# Patient Record
Sex: FEMALE | Race: OTHER | Employment: FULL TIME | URBAN - METROPOLITAN AREA
[De-identification: names, ages, dates, MRNs, and addresses within clinical notes are randomized per-mention and may not be internally consistent; named-entity substitution may affect disease eponyms.]

---

## 2017-02-03 ENCOUNTER — HOSPITAL ENCOUNTER (OUTPATIENT)
Dept: LAB | Age: 27
Discharge: HOME | End: 2017-02-03

## 2017-02-03 LAB
ALBUMIN SERPL BCP-MCNC: 3.8 G/DL (ref 3.5–5)
ALBUMIN/GLOB SERPL: 1.2 {RATIO} (ref 1.1–2.2)
ALP SERPL-CCNC: 48 U/L (ref 45–117)
ALT SERPL-CCNC: 19 U/L (ref 12–78)
ANION GAP BLD CALC-SCNC: 8 MMOL/L (ref 5–15)
AST SERPL W P-5'-P-CCNC: 10 U/L (ref 15–37)
BASOPHILS # BLD AUTO: 0 K/UL (ref 0–0.1)
BASOPHILS # BLD: 0 % (ref 0–1)
BILIRUB SERPL-MCNC: 0.3 MG/DL (ref 0.2–1)
BUN SERPL-MCNC: 19 MG/DL (ref 6–20)
BUN/CREAT SERPL: 29 (ref 12–20)
CALCIUM SERPL-MCNC: 8.9 MG/DL (ref 8.5–10.1)
CHLORIDE SERPL-SCNC: 106 MMOL/L (ref 97–108)
CHOLEST SERPL-MCNC: 174 MG/DL
CO2 SERPL-SCNC: 29 MMOL/L (ref 21–32)
CREAT SERPL-MCNC: 0.65 MG/DL (ref 0.55–1.02)
EOSINOPHIL # BLD: 0.2 K/UL (ref 0–0.4)
EOSINOPHIL NFR BLD: 3 % (ref 0–7)
ERYTHROCYTE [DISTWIDTH] IN BLOOD BY AUTOMATED COUNT: 14.5 % (ref 11.5–14.5)
GLOBULIN SER CALC-MCNC: 3.3 G/DL (ref 2–4)
GLUCOSE SERPL-MCNC: 80 MG/DL (ref 65–100)
HCT VFR BLD AUTO: 38.8 % (ref 35–47)
HDLC SERPL-MCNC: 58 MG/DL
HDLC SERPL: 3 {RATIO} (ref 0–5)
HGB BLD-MCNC: 12.4 G/DL (ref 11.5–16)
LDLC SERPL CALC-MCNC: 108 MG/DL (ref 0–100)
LIPID PROFILE,FLP: ABNORMAL
LYMPHOCYTES # BLD AUTO: 40 % (ref 12–49)
LYMPHOCYTES # BLD: 2.4 K/UL (ref 0.8–3.5)
MCH RBC QN AUTO: 27.4 PG (ref 26–34)
MCHC RBC AUTO-ENTMCNC: 32 G/DL (ref 30–36.5)
MCV RBC AUTO: 85.7 FL (ref 80–99)
MONOCYTES # BLD: 0.5 K/UL (ref 0–1)
MONOCYTES NFR BLD AUTO: 8 % (ref 5–13)
NEUTS SEG # BLD: 3 K/UL (ref 1.8–8)
NEUTS SEG NFR BLD AUTO: 49 % (ref 32–75)
PLATELET # BLD AUTO: 312 K/UL (ref 150–400)
POTASSIUM SERPL-SCNC: 4 MMOL/L (ref 3.5–5.1)
PROT SERPL-MCNC: 7.1 G/DL (ref 6.4–8.2)
RBC # BLD AUTO: 4.53 M/UL (ref 3.8–5.2)
SODIUM SERPL-SCNC: 143 MMOL/L (ref 136–145)
TRIGL SERPL-MCNC: 40 MG/DL (ref ?–150)
VLDLC SERPL CALC-MCNC: 8 MG/DL
WBC # BLD AUTO: 6.1 K/UL (ref 3.6–11)

## 2017-02-03 PROCEDURE — 80061 LIPID PANEL: CPT | Performed by: INTERNAL MEDICINE

## 2017-02-03 PROCEDURE — 85025 COMPLETE CBC W/AUTO DIFF WBC: CPT | Performed by: INTERNAL MEDICINE

## 2017-02-03 PROCEDURE — 80053 COMPREHEN METABOLIC PANEL: CPT | Performed by: INTERNAL MEDICINE

## 2021-02-18 ENCOUNTER — TRANSCRIBE ORDER (OUTPATIENT)
Dept: SCHEDULING | Age: 31
End: 2021-02-18

## 2021-02-23 ENCOUNTER — TRANSCRIBE ORDER (OUTPATIENT)
Dept: SCHEDULING | Age: 31
End: 2021-02-23

## 2021-02-23 DIAGNOSIS — N91.5 OLIGOMENORRHEA, UNSPECIFIED: ICD-10-CM

## 2021-02-23 DIAGNOSIS — E22.1 HYPERPROLACTINEMIA (HCC): Primary | ICD-10-CM

## 2021-03-16 ENCOUNTER — HOSPITAL ENCOUNTER (OUTPATIENT)
Dept: MRI IMAGING | Age: 31
Discharge: HOME | End: 2021-03-16
Attending: INTERNAL MEDICINE
Payer: COMMERCIAL

## 2021-03-16 VITALS — WEIGHT: 170 LBS

## 2021-03-16 DIAGNOSIS — E22.1 HYPERPROLACTINEMIA (HCC): ICD-10-CM

## 2021-03-16 DIAGNOSIS — N91.5 OLIGOMENORRHEA, UNSPECIFIED: ICD-10-CM

## 2021-03-16 PROCEDURE — 70553 MRI BRAIN STEM W/O & W/DYE: CPT

## 2021-03-16 PROCEDURE — 74011250636 HC RX REV CODE- 250/636: Performed by: INTERNAL MEDICINE

## 2021-03-16 PROCEDURE — 74011000258 HC RX REV CODE- 258: Performed by: INTERNAL MEDICINE

## 2021-03-16 PROCEDURE — A9585 GADOBUTROL INJECTION: HCPCS | Performed by: INTERNAL MEDICINE

## 2021-03-16 RX ORDER — SODIUM CHLORIDE 0.9 % (FLUSH) 0.9 %
10 SYRINGE (ML) INJECTION ONCE
Status: COMPLETED | OUTPATIENT
Start: 2021-03-16 | End: 2021-03-16

## 2021-03-16 RX ADMIN — Medication 10 ML: at 09:23

## 2021-03-16 RX ADMIN — GADOBUTROL 7.5 ML: 604.72 INJECTION INTRAVENOUS at 09:22

## 2021-03-16 RX ADMIN — SODIUM CHLORIDE 100 ML: 900 INJECTION, SOLUTION INTRAVENOUS at 09:23

## 2021-05-10 LAB
ANTIBODY SCREEN, EXTERNAL: NEGATIVE
CHLAMYDIA, EXTERNAL: NEGATIVE
HBSAG, EXTERNAL: NEGATIVE
HEPATITIS C AB,   EXT: NEGATIVE
HIV, EXTERNAL: NORMAL
N. GONORRHEA, EXTERNAL: NEGATIVE
RUBELLA, EXTERNAL: NORMAL
TYPE, ABO & RH, EXTERNAL: NORMAL

## 2021-10-08 LAB — ANTIBODY SCREEN, EXTERNAL: NEGATIVE

## 2021-10-22 LAB — T. PALLIDUM, EXTERNAL: NORMAL

## 2021-11-14 ENCOUNTER — HOSPITAL ENCOUNTER (OUTPATIENT)
Age: 31
Setting detail: OBSERVATION
Discharge: HOME | End: 2021-11-16
Attending: OBSTETRICS & GYNECOLOGY | Admitting: OBSTETRICS & GYNECOLOGY
Payer: COMMERCIAL

## 2021-11-14 PROBLEM — O26.613 CHOLESTASIS DURING PREGNANCY IN THIRD TRIMESTER: Status: ACTIVE | Noted: 2021-11-14

## 2021-11-14 PROBLEM — O30.009 PREGNANCY, TWINS: Status: ACTIVE | Noted: 2021-11-14

## 2021-11-14 PROBLEM — K83.1 CHOLESTASIS DURING PREGNANCY IN THIRD TRIMESTER: Status: ACTIVE | Noted: 2021-11-14

## 2021-11-14 PROBLEM — O47.03 PRETERM UTERINE CONTRACTIONS IN THIRD TRIMESTER, ANTEPARTUM: Status: ACTIVE | Noted: 2021-11-14

## 2021-11-14 LAB
ALBUMIN SERPL-MCNC: 1.9 G/DL (ref 3.5–5)
ALBUMIN/GLOB SERPL: 0.4 {RATIO} (ref 1.1–2.2)
ALP SERPL-CCNC: 167 U/L (ref 45–117)
ALT SERPL-CCNC: 83 U/L (ref 12–78)
ANION GAP SERPL CALC-SCNC: 10 MMOL/L (ref 5–15)
AST SERPL-CCNC: 31 U/L (ref 15–37)
BILIRUB SERPL-MCNC: 0.2 MG/DL (ref 0.2–1)
BUN SERPL-MCNC: 14 MG/DL (ref 6–20)
BUN/CREAT SERPL: 23 (ref 12–20)
CALCIUM SERPL-MCNC: 9 MG/DL (ref 8.5–10.1)
CHLORIDE SERPL-SCNC: 108 MMOL/L (ref 97–108)
CO2 SERPL-SCNC: 18 MMOL/L (ref 21–32)
COVID-19 RAPID TEST, COVR: NOT DETECTED
CREAT SERPL-MCNC: 0.6 MG/DL (ref 0.55–1.02)
ERYTHROCYTE [DISTWIDTH] IN BLOOD BY AUTOMATED COUNT: 15.2 % (ref 11.5–14.5)
GLOBULIN SER CALC-MCNC: 4.3 G/DL (ref 2–4)
GLUCOSE SERPL-MCNC: 71 MG/DL (ref 65–100)
HCT VFR BLD AUTO: 40.3 % (ref 35–47)
HGB BLD-MCNC: 13.5 G/DL (ref 11.5–16)
MCH RBC QN AUTO: 27.9 PG (ref 26–34)
MCHC RBC AUTO-ENTMCNC: 33.5 G/DL (ref 30–36.5)
MCV RBC AUTO: 83.3 FL (ref 80–99)
NRBC # BLD: 0 K/UL (ref 0–0.01)
NRBC BLD-RTO: 0 PER 100 WBC
PLATELET # BLD AUTO: 347 K/UL (ref 150–400)
PMV BLD AUTO: 10.7 FL (ref 8.9–12.9)
POTASSIUM SERPL-SCNC: 4 MMOL/L (ref 3.5–5.1)
PROT SERPL-MCNC: 6.2 G/DL (ref 6.4–8.2)
RBC # BLD AUTO: 4.84 M/UL (ref 3.8–5.2)
SARS-COV-2, COV2: NORMAL
SODIUM SERPL-SCNC: 136 MMOL/L (ref 136–145)
SOURCE, COVRS: NORMAL
WBC # BLD AUTO: 12.7 K/UL (ref 3.6–11)

## 2021-11-14 PROCEDURE — 74011250636 HC RX REV CODE- 250/636: Performed by: OBSTETRICS & GYNECOLOGY

## 2021-11-14 PROCEDURE — 96360 HYDRATION IV INFUSION INIT: CPT

## 2021-11-14 PROCEDURE — 36415 COLL VENOUS BLD VENIPUNCTURE: CPT

## 2021-11-14 PROCEDURE — 85027 COMPLETE CBC AUTOMATED: CPT

## 2021-11-14 PROCEDURE — 96361 HYDRATE IV INFUSION ADD-ON: CPT

## 2021-11-14 PROCEDURE — G0378 HOSPITAL OBSERVATION PER HR: HCPCS

## 2021-11-14 PROCEDURE — 99285 EMERGENCY DEPT VISIT HI MDM: CPT

## 2021-11-14 PROCEDURE — 74011250637 HC RX REV CODE- 250/637: Performed by: OBSTETRICS & GYNECOLOGY

## 2021-11-14 PROCEDURE — 59025 FETAL NON-STRESS TEST: CPT

## 2021-11-14 PROCEDURE — 87635 SARS-COV-2 COVID-19 AMP PRB: CPT

## 2021-11-14 PROCEDURE — 96372 THER/PROPH/DIAG INJ SC/IM: CPT

## 2021-11-14 PROCEDURE — 76815 OB US LIMITED FETUS(S): CPT

## 2021-11-14 PROCEDURE — 80053 COMPREHEN METABOLIC PANEL: CPT

## 2021-11-14 RX ORDER — NIFEDIPINE 10 MG/1
20 CAPSULE ORAL ONCE
Status: COMPLETED | OUTPATIENT
Start: 2021-11-14 | End: 2021-11-14

## 2021-11-14 RX ORDER — GUAIFENESIN 100 MG/5ML
81 LIQUID (ML) ORAL DAILY
COMMUNITY
End: 2021-12-09

## 2021-11-14 RX ORDER — URSODIOL 500 MG/1
500 TABLET, FILM COATED ORAL 2 TIMES DAILY
Status: DISCONTINUED | OUTPATIENT
Start: 2021-11-14 | End: 2021-11-16 | Stop reason: HOSPADM

## 2021-11-14 RX ORDER — SODIUM CHLORIDE 0.9 % (FLUSH) 0.9 %
5-40 SYRINGE (ML) INJECTION EVERY 8 HOURS
Status: DISCONTINUED | OUTPATIENT
Start: 2021-11-14 | End: 2021-11-16 | Stop reason: HOSPADM

## 2021-11-14 RX ORDER — BETAMETHASONE SODIUM PHOSPHATE AND BETAMETHASONE ACETATE 3; 3 MG/ML; MG/ML
12 INJECTION, SUSPENSION INTRA-ARTICULAR; INTRALESIONAL; INTRAMUSCULAR; SOFT TISSUE EVERY 24 HOURS
Status: COMPLETED | OUTPATIENT
Start: 2021-11-14 | End: 2021-11-15

## 2021-11-14 RX ORDER — SODIUM CHLORIDE 0.9 % (FLUSH) 0.9 %
5-40 SYRINGE (ML) INJECTION AS NEEDED
Status: DISCONTINUED | OUTPATIENT
Start: 2021-11-14 | End: 2021-11-16 | Stop reason: HOSPADM

## 2021-11-14 RX ORDER — URSODIOL 500 MG/1
500 TABLET, FILM COATED ORAL 2 TIMES DAILY
COMMUNITY
End: 2021-12-09

## 2021-11-14 RX ORDER — SODIUM CHLORIDE 9 MG/ML
125 INJECTION, SOLUTION INTRAVENOUS CONTINUOUS
Status: DISPENSED | OUTPATIENT
Start: 2021-11-14 | End: 2021-11-15

## 2021-11-14 RX ADMIN — BETAMETHASONE SODIUM PHOSPHATE AND BETAMETHASONE ACETATE 12 MG: 3; 3 INJECTION, SUSPENSION INTRA-ARTICULAR; INTRALESIONAL; INTRAMUSCULAR at 14:42

## 2021-11-14 RX ADMIN — SODIUM CHLORIDE 1000 ML: 9 INJECTION, SOLUTION INTRAVENOUS at 15:07

## 2021-11-14 RX ADMIN — NIFEDIPINE 20 MG: 10 CAPSULE ORAL at 17:10

## 2021-11-14 RX ADMIN — URSODIOL 500 MG: 500 TABLET, FILM COATED ORAL at 21:11

## 2021-11-14 RX ADMIN — Medication 10 ML: at 23:03

## 2021-11-14 RX ADMIN — SODIUM CHLORIDE 125 ML/HR: 9 INJECTION, SOLUTION INTRAVENOUS at 16:18

## 2021-11-14 NOTE — PROGRESS NOTES
1327: Patient arrived to L & D triage c/o cramping and possible contractions, concerns about light pink discharge, and decreased FM from one of her twins. She is , mono di twins, patient of [facility], medical history pituitary microadenoma, UTI in pregnancy, obesity, and cholestasis in pregnancy. Denies LOF, VB, HA or blurred vision, decreased FM from twin B which is on the right she states. Urine sample obtained, changed to a gown and EFM applied. 1410:  at bedside for examination. SVE 0//3 by , not active labor at this time, POC discussed for 24 observation with continuous monitoring, and betamethasone x2.    1700:  ordered 20mg nifedipine PO now due to contractions Q4m.

## 2021-11-14 NOTE — H&P
History & Physical    Name: Johan Owens MRN: [id number]  SSN: [id number]    YOB: 1990  Age: 28 y.o. Sex: female        Subjective:   CC: Vaginal spotting and cramping  Estimated Date of Delivery: 21  OB History    Para Term  AB Living   1             SAB IAB Ectopic Molar Multiple Live Births                    # Outcome Date GA Lbr Harpreet/2nd Weight Sex Delivery Anes PTL Lv   1 Current                Ms. Sonia Aguilar is admitted for observation with pregnancy at 34w0d for c/o non descript mild cramping for several hours preceeded by minimal vaginal spotting. No ROM. Pt also believes that the baby on her right side is moving less. She had a normal BPPs of 10/10 with normal dopplers x 2. Her cervix has been closed in the office and her last exam was 5 days ago. . Prenatal course was complicated by Mono/Di Twins vtx/breech, Cholestasis of pregnancy, and elevated BMI. Please see prenatal records for details. Past Medical History:   Diagnosis Date    Cholestasis during pregnancy in third trimester 2021     Past Surgical History:   Procedure Laterality Date    HX OTHER SURGICAL      adnoids when 6    HX OTHER SURGICAL      uterine polyps     Social History     Occupational History    Not on file   Tobacco Use    Smoking status: Never Smoker    Smokeless tobacco: Never Used   Substance and Sexual Activity    Alcohol use: Not Currently    Drug use: Never    Sexual activity: Not on file     No family history on file. No Known Allergies  Prior to Admission medications    Medication Sig Start Date End Date Taking? Authorizing Provider   PNV SJ.73/VYQBPUY fum/folic ac (PRENATAL PO) Take  by mouth. Yes Provider, Historical   aspirin 81 mg chewable tablet Take 81 mg by mouth daily. Yes Provider, Historical   ursodioL (ACTIGALL) 500 mg tablet Take 500 mg by mouth two (2) times a day.    Yes Provider, Historical        Review of Systems   Constitutional: Negative for chills and fever.   HENT: Positive for sore throat. Negative for mouth sores. Eyes: Negative for photophobia and visual disturbance. Respiratory: Positive for cough and wheezing. Negative for shortness of breath. Cardiovascular: Negative for chest pain and palpitations. Gastrointestinal: Positive for abdominal pain. Negative for diarrhea, nausea and vomiting. Endocrine: Negative for cold intolerance and heat intolerance. Genitourinary: Positive for vaginal bleeding. Negative for dysuria, genital sores and hematuria. Musculoskeletal: Negative for arthralgias, back pain and myalgias. Skin: Negative for rash. Neurological: Negative for dizziness and headaches. Hematological: Negative for adenopathy. Psychiatric/Behavioral: Negative for agitation, behavioral problems, confusion and decreased concentration. Objective:     Vitals:  Vitals:    11/14/21 1411 11/14/21 1416 11/14/21 1421 11/14/21 1426   BP:       Pulse:       Resp:       Temp:       SpO2: 97% 97% 96% 96%   Weight:       Height:            Physical Exam  Vitals and nursing note reviewed. Exam conducted with a chaperone present. Constitutional:       General: She is not in acute distress. Appearance: Normal appearance. She is obese. She is not ill-appearing, toxic-appearing or diaphoretic. HENT:      Head: Normocephalic and atraumatic. Right Ear: External ear normal.      Left Ear: External ear normal.   Eyes:      General: No scleral icterus. Right eye: No discharge. Left eye: No discharge. Extraocular Movements: Extraocular movements intact. Cardiovascular:      Rate and Rhythm: Normal rate and regular rhythm. Heart sounds: Normal heart sounds. No murmur heard. No friction rub. No gallop. Pulmonary:      Effort: Pulmonary effort is normal. No respiratory distress. Breath sounds: Normal breath sounds. No stridor. No wheezing, rhonchi or rales. Abdominal:      General: There is no distension. Palpations: Abdomen is soft. There is no mass. Tenderness: There is no abdominal tenderness. There is no right CVA tenderness, left CVA tenderness, guarding or rebound. Hernia: No hernia is present. Genitourinary:     General: Normal vulva. Musculoskeletal:         General: No swelling, tenderness, deformity or signs of injury. Cervical back: Normal range of motion and neck supple. No rigidity or tenderness. Right lower leg: No edema. Left lower leg: No edema. Lymphadenopathy:      Cervical: No cervical adenopathy. Skin:     General: Skin is warm and dry. Capillary Refill: Capillary refill takes less than 2 seconds. Coloration: Skin is not jaundiced or pale. Findings: No bruising, erythema, lesion or rash. Neurological:      Mental Status: She is alert and oriented to person, place, and time. Deep Tendon Reflexes: Reflexes normal.   Psychiatric:         Mood and Affect: Mood normal.         Behavior: Behavior normal.         Thought Content: Thought content normal.         Judgment: Judgment normal.         Cervical Exam: Closed/Thick/High  Uterine Activity: Frequency: Q 3-5 minutes  Membranes: Intact  Fetal Heart Rate: Reactive x 2     Prenatal Labs:   No results found for: ABORH, RUBELLAEXT, GRBSEXT, HBSAGEXT, HIVEXT, RPREXT, GONNOEXT, CHLAMEXT, ABORHEXT, RUBELLAEXT, GRBSEXT, HBSAGEXT, HIVEXT, RPREXT, GONNOEXT, CHLAMEXT       Impression/Plan:     Active Problems:    Pregnancy, twins (2021)       uterine contractions in third trimester, antepartum (2021)      Cholestasis during pregnancy in third trimester (2021)         Plan: Admit for observation. Labs,IVFs,BMZ. T/C Nifedipine tocolysis for full steroid coverage if frequency becomes closer or persistent.  for delivery if progresses as per OP plan.

## 2021-11-15 LAB
ALT SERPL-CCNC: 78 U/L (ref 12–78)
AST SERPL-CCNC: 26 U/L (ref 15–37)
GRBS, EXTERNAL: NEGATIVE

## 2021-11-15 PROCEDURE — 84450 TRANSFERASE (AST) (SGOT): CPT

## 2021-11-15 PROCEDURE — 74011250636 HC RX REV CODE- 250/636: Performed by: OBSTETRICS & GYNECOLOGY

## 2021-11-15 PROCEDURE — 87081 CULTURE SCREEN ONLY: CPT

## 2021-11-15 PROCEDURE — G0378 HOSPITAL OBSERVATION PER HR: HCPCS

## 2021-11-15 PROCEDURE — 96372 THER/PROPH/DIAG INJ SC/IM: CPT

## 2021-11-15 PROCEDURE — 84460 ALANINE AMINO (ALT) (SGPT): CPT

## 2021-11-15 PROCEDURE — 90715 TDAP VACCINE 7 YRS/> IM: CPT | Performed by: OBSTETRICS & GYNECOLOGY

## 2021-11-15 PROCEDURE — 74011250637 HC RX REV CODE- 250/637: Performed by: OBSTETRICS & GYNECOLOGY

## 2021-11-15 PROCEDURE — 36415 COLL VENOUS BLD VENIPUNCTURE: CPT

## 2021-11-15 RX ORDER — FOLIC ACID/MULTIVIT,IRON,MINER 0.4MG-18MG
1 TABLET ORAL DAILY
Status: DISCONTINUED | OUTPATIENT
Start: 2021-11-15 | End: 2021-11-16 | Stop reason: HOSPADM

## 2021-11-15 RX ORDER — ASPIRIN 81 MG/1
81 TABLET ORAL DAILY
Status: DISCONTINUED | OUTPATIENT
Start: 2021-11-15 | End: 2021-11-16 | Stop reason: HOSPADM

## 2021-11-15 RX ORDER — FOLIC ACID/MULTIVIT,IRON,MINER 0.4MG-18MG
1 TABLET ORAL DAILY
Status: DISCONTINUED | OUTPATIENT
Start: 2021-11-15 | End: 2021-11-15

## 2021-11-15 RX ORDER — NIFEDIPINE 10 MG/1
20 CAPSULE ORAL 4 TIMES DAILY
Status: DISCONTINUED | OUTPATIENT
Start: 2021-11-15 | End: 2021-11-15

## 2021-11-15 RX ADMIN — NIFEDIPINE 20 MG: 10 CAPSULE ORAL at 09:00

## 2021-11-15 RX ADMIN — URSODIOL 500 MG: 500 TABLET, FILM COATED ORAL at 09:35

## 2021-11-15 RX ADMIN — Medication 1 TABLET: at 10:00

## 2021-11-15 RX ADMIN — SODIUM CHLORIDE 125 ML/HR: 9 INJECTION, SOLUTION INTRAVENOUS at 00:06

## 2021-11-15 RX ADMIN — TETANUS TOXOID, REDUCED DIPHTHERIA TOXOID AND ACELLULAR PERTUSSIS VACCINE, ADSORBED 0.5 ML: 5; 2.5; 8; 8; 2.5 SUSPENSION INTRAMUSCULAR at 21:15

## 2021-11-15 RX ADMIN — ASPIRIN 81 MG: 81 TABLET, COATED ORAL at 10:44

## 2021-11-15 RX ADMIN — BETAMETHASONE SODIUM PHOSPHATE AND BETAMETHASONE ACETATE 12 MG: 3; 3 INJECTION, SUSPENSION INTRA-ARTICULAR; INTRALESIONAL; INTRAMUSCULAR at 16:19

## 2021-11-15 RX ADMIN — URSODIOL 500 MG: 500 TABLET, FILM COATED ORAL at 21:18

## 2021-11-15 NOTE — PROGRESS NOTES
Ante Partum Progress Note    Kati Torres  34w1d    Assessment: G1 34w1d mo/di twins vtx/breech, admitted with spotting,  contractions. Pregnancy cb:   - cholestasis, on ursodiol. Mild elevation in lfts noted in office, with AST44/. Improved here at   - pituitary macroadenoma - followed by endocrine    Plan:    - continues to contract q 3-4 min, some painful overnight. Cervix this am, ft external os/soft/50%/high this am. Will restart procardia 20 q 6 and continue inpatient monitoring for labor. Check gbs. Pt has been counseled regarding mode of delivery and feels most comfortable with c/s in setting of breech 2nd twin. - prematurity - sp bmz #1 3pm yesterday. Repeat today, obs until tomorrow and dc home if no labor. Notify nicu.  - di/mo twins. Concordant. A: 1995g (43%) breech. B g (52%) ceph and presenting      Orders/Charges: Medium and Non Stress Test  X 2    Patient states she has continued contractions, though mostly non-painful. Still light pink spotting. No LOF. Decreased fetal movement of baby on right has resolved. Vitals:  Visit Vitals  /65   Pulse 84   Temp 98.5 °F (36.9 °C)   Resp 16   Ht 5' 2.5\" (1.588 m)   Wt 97.1 kg (214 lb)   LMP  (Exact Date)   SpO2 100%   BMI 38.52 kg/m²     Temp (24hrs), Av °F (36.7 °C), Min:97.6 °F (36.4 °C), Max:98.5 °F (36.9 °C)      Last 24hr Input/Output:    Intake/Output Summary (Last 24 hours) at 11/15/2021 0901  Last data filed at 2021 1619  Gross per 24 hour   Intake 1000 ml   Output    Net 1000 ml        Non stress test:  A: 120, moderate variability, cat I     B: 120, moderate variability, cat I  Patient Vitals for the past 4 hrs: Mode Fetal Heart Rate Fetal Activity Variability Decelerations Accelerations RN Reviewed Strip?  Non Stress Test   11/15/21 0803 External 120  6-25 BPM None Yes Yes Reactive   11/15/21 0700 External 125 Present 6-25 BPM None Yes Yes    11/15/21 0615 External 130 Present 6-25 BPM None Yes Yes    11/15/21 0545 External 125 Present 6-25 BPM Variable Yes Yes    11/15/21 0515 External 125 Present 6-25 BPM None Yes Yes       Patient Vitals for the past 4 hrs: Mode Fetal Heart Rate Fetal Activity Variability Decelerations Accelerations RN Reviewed Strip? Non Stress Test   11/15/21 0803 External 120  6-25 BPM None Yes Yes Reactive   11/15/21 0700 External 125 Present 6-25 BPM None Yes Yes    11/15/21 0615 External 130 Present 6-25 BPM None Yes Yes    11/15/21 0545 External 125 Present 6-25 BPM Variable Yes Yes    11/15/21 0515 External 125 Present 6-25 BPM None Yes Yes         Uterine Activity: q 3-4 min     Exam:  Patient without distress.      Abdomen, fundus soft non-tender     Extremities, no redness or tenderness               Additional Exam: ft/50/high    Labs:     Lab Results   Component Value Date/Time    WBC 12.7 (H) 11/14/2021 02:54 PM    WBC 6.1 02/03/2017 10:30 AM    WBC 8.2 02/01/2011 12:30 PM    HGB 13.5 11/14/2021 02:54 PM    HGB 12.4 02/03/2017 10:30 AM    HGB 13.2 02/01/2011 12:30 PM    HCT 40.3 11/14/2021 02:54 PM    HCT 38.8 02/03/2017 10:30 AM    HCT 41.3 02/01/2011 12:30 PM    PLATELET 680 25/57/0681 02:54 PM    PLATELET 810 06/69/6938 10:30 AM    PLATELET 695 17/00/2146 12:30 PM       Recent Results (from the past 24 hour(s))   CBC W/O DIFF    Collection Time: 11/14/21  2:54 PM   Result Value Ref Range    WBC 12.7 (H) 3.6 - 11.0 K/uL    RBC 4.84 3.80 - 5.20 M/uL    HGB 13.5 11.5 - 16.0 g/dL    HCT 40.3 35.0 - 47.0 %    MCV 83.3 80.0 - 99.0 FL    MCH 27.9 26.0 - 34.0 PG    MCHC 33.5 30.0 - 36.5 g/dL    RDW 15.2 (H) 11.5 - 14.5 %    PLATELET 240 258 - 482 K/uL    MPV 10.7 8.9 - 12.9 FL    NRBC 0.0 0  WBC    ABSOLUTE NRBC 0.00 0.00 - 8.38 K/uL   METABOLIC PANEL, COMPREHENSIVE    Collection Time: 11/14/21  4:04 PM   Result Value Ref Range    Sodium 136 136 - 145 mmol/L    Potassium 4.0 3.5 - 5.1 mmol/L    Chloride 108 97 - 108 mmol/L    CO2 18 (L) 21 - 32 mmol/L    Anion gap 10 5 - 15 mmol/L    Glucose 71 65 - 100 mg/dL    BUN 14 6 - 20 MG/DL    Creatinine 0.60 0.55 - 1.02 MG/DL    BUN/Creatinine ratio 23 (H) 12 - 20      GFR est AA >60 >60 ml/min/1.73m2    GFR est non-AA >60 >60 ml/min/1.73m2    Calcium 9.0 8.5 - 10.1 MG/DL    Bilirubin, total 0.2 0.2 - 1.0 MG/DL    ALT (SGPT) 83 (H) 12 - 78 U/L    AST (SGOT) 31 15 - 37 U/L    Alk.  phosphatase 167 (H) 45 - 117 U/L    Protein, total 6.2 (L) 6.4 - 8.2 g/dL    Albumin 1.9 (L) 3.5 - 5.0 g/dL    Globulin 4.3 (H) 2.0 - 4.0 g/dL    A-G Ratio 0.4 (L) 1.1 - 2.2     SARS-COV-2    Collection Time: 11/14/21  4:04 PM   Result Value Ref Range    SARS-CoV-2 Please find results under separate order     COVID-19 RAPID TEST    Collection Time: 11/14/21  4:04 PM   Result Value Ref Range    Specimen source Nasopharyngeal      COVID-19 rapid test Not detected NOTD     ALT    Collection Time: 11/15/21  5:46 AM   Result Value Ref Range    ALT (SGPT) 78 12 - 78 U/L   AST    Collection Time: 11/15/21  5:46 AM   Result Value Ref Range    AST (SGOT) 26 15 - 37 U/L

## 2021-11-15 NOTE — PROGRESS NOTES
7:15 AM  Bedside shift change report given to Filippo Vieyra RN (oncoming nurse) by Jerardo oNrman RN (offgoing nurse). Report included the following information SBAR, Kardex, MAR and Recent Results.

## 2021-11-15 NOTE — PROGRESS NOTES
Pt feeling better. On afternoon NST both babies are reactive  One small variable of baby B that quickly returned to baseline and reassuring with accels following that  She will get her 2nd bmz now  We discussed discharge home vs monitoring until tomorrow. Given anxiety level and twin pregnancy will obs until tomorrow  Got one dose of nifedipine, will stop now steroid complete  Had questions about tdap vaccine - recommended this, she would like to get this today.

## 2021-11-15 NOTE — PROGRESS NOTES
7:20 PM  Bedside shift change report given to Jerardo Norman RN (oncoming nurse) by Remington Sommer (offgoing nurse). Report included the following information SBAR, Kardex, Intake/Output, MAR and Recent Results. 8:03 PM  SVE performed by Dr Donna Sweeney. Cervix remains closed. May remove pt from monitoring for the night unless pt reports any changes.

## 2021-11-16 VITALS
DIASTOLIC BLOOD PRESSURE: 59 MMHG | HEART RATE: 77 BPM | BODY MASS INDEX: 37.92 KG/M2 | OXYGEN SATURATION: 97 % | SYSTOLIC BLOOD PRESSURE: 109 MMHG | HEIGHT: 63 IN | TEMPERATURE: 97.7 F | WEIGHT: 214 LBS | RESPIRATION RATE: 16 BRPM

## 2021-11-16 PROCEDURE — G0378 HOSPITAL OBSERVATION PER HR: HCPCS

## 2021-11-16 PROCEDURE — 74011250637 HC RX REV CODE- 250/637: Performed by: OBSTETRICS & GYNECOLOGY

## 2021-11-16 RX ADMIN — URSODIOL 500 MG: 500 TABLET, FILM COATED ORAL at 08:54

## 2021-11-16 RX ADMIN — Medication 1 TABLET: at 09:00

## 2021-11-16 RX ADMIN — ASPIRIN 81 MG: 81 TABLET, COATED ORAL at 08:54

## 2021-11-16 NOTE — DISCHARGE INSTRUCTIONS
Patient Education         Labor: Care Instructions  Overview      labor is the start of labor between 21 and 36 weeks of pregnancy. Most babies are born at 40 to 41 weeks of pregnancy. In labor, the uterus contracts to open the cervix. This is the first stage of childbirth.  labor can be caused by a problem with the baby, the mother, or both. Often the cause is not known. In some cases, doctors use medicines to try to delay labor until 29 or more weeks of pregnancy. By this time, a baby has grown enough so that problems are not likely. In some cases--such as with a serious infection--it is healthier for the baby to be born early. Your treatment will depend on how far along you are in your pregnancy and on your health and your baby's health. Follow-up care is a key part of your treatment and safety. Be sure to make and go to all appointments, and call your doctor if you are having problems. It's also a good idea to know your test results and keep a list of the medicines you take. How can you care for yourself at home? · If your doctor prescribed medicines, take them exactly as directed. Call your doctor if you think you are having a problem with your medicine. · Rest until your doctor advises you about activity. · Do not have sexual intercourse unless your doctor says it is safe. · Use sanitary pads if you have vaginal bleeding. Using pads makes it easier to monitor your bleeding. · Do not smoke or allow others to smoke around you. If you need help quitting, talk to your doctor about stop-smoking programs and medicines. These can increase your chances of quitting for good. When should you call for help? Call 911  anytime you think you may need emergency care.  For example, call if:    · You passed out (lost consciousness).     · You have a seizure.     · You have severe vaginal bleeding.     · You have severe pain in your belly or pelvis that doesn't get better between contractions.     · You have had fluid gushing or leaking from your vagina and you know or think the umbilical cord is bulging into your vagina. If this happens, immediately get down on your knees so your rear end (buttocks) is higher than your head. This will decrease the pressure on the cord until help arrives. Call your doctor now or seek immediate medical care if:    · You have signs of preeclampsia, such as:  ? Sudden swelling of your face, hands, or feet. ? New vision problems (such as dimness, blurring, or seeing spots). ? A severe headache.     · You have any vaginal bleeding.     · You have belly pain or cramping.     · You have a fever.     · You have had regular contractions (with or without pain) for an hour. This means that you have 6 or more within 1 hour after you change your position and drink fluids.     · You have a sudden release of fluid from the vagina.     · You have low back pain or pelvic pressure that does not go away.     · You notice that your baby has stopped moving or is moving much less than normal.   Watch closely for changes in your health, and be sure to contact your doctor if you have any problems. Where can you learn more? Go to http://www.gray.com/  Enter Q400 in the search box to learn more about \" Labor: Care Instructions. \"  Current as of: 2021               Content Version: 13.0  © 4308-2905 [employer]. Care instructions adapted under license by [employer] (which disclaims liability or warranty for this information). If you have questions about a medical condition or this instruction, always ask your healthcare professional. [place] [address] any warranty or liability for your use of this information.

## 2021-11-16 NOTE — PROGRESS NOTES
Ante Partum Progress Note    Tejal Fallon Bayron  34w2d    Assessment: G1 34w2d mo/di twins vtx/breech, admitted with spotting,  contractions. Pregnancy cb:   - cholestasis, on ursodiol. Mild elevation in lfts noted in office, with AST44/. Improved here at   - pituitary macroadenoma - followed by endocrine    Plan:    - Contractions spaced s/p Nifedipine, not feeling any this morning  - Cervical exam unchanged yesterday - FT externally/soft/50%/high  - S/p BMZ x2 (, 11/15)  - mo/di twins. Concordant. A: 1995g (43%) breech. B g (52%) ceph and presenting      Orders/Charges: Medium and Non Stress Test  X 2    Contractions largely resolved. Spotting resolved. No LOF. Good FM x2. Vitals:  Visit Vitals  BP (!) 109/59   Pulse 77   Temp 97.7 °F (36.5 °C)   Resp 16   Ht 5' 2.5\" (1.588 m)   Wt 97.1 kg (214 lb)   LMP  (Exact Date)   SpO2 97%   BMI 38.52 kg/m²     Temp (24hrs), Av.1 °F (36.7 °C), Min:97.7 °F (36.5 °C), Max:98.5 °F (36.9 °C)      Last 24hr Input/Output:  No intake or output data in the 24 hours ending 21 0919     Non stress test:  A: 120, moderate variability, cat I     B: 120, moderate variability, cat I  No data found. No data found. Uterine Activity: none, some baseline irritability    Exam:  Patient without distress.      Abdomen, fundus soft non-tender     Extremities, no redness or tenderness               Additional Exam: ft/50/high    Labs:     Lab Results   Component Value Date/Time    WBC 12.7 (H) 2021 02:54 PM    WBC 6.1 2017 10:30 AM    WBC 8.2 2011 12:30 PM    HGB 13.5 2021 02:54 PM    HGB 12.4 2017 10:30 AM    HGB 13.2 2011 12:30 PM    HCT 40.3 2021 02:54 PM    HCT 38.8 2017 10:30 AM    HCT 41.3 2011 12:30 PM    PLATELET 853 45/10/0261 02:54 PM    PLATELET 395  10:30 AM    PLATELET 734  12:30 PM       No results found for this or any previous visit (from the past 24 hour(s)).       Nabor Jean-Baptiste MD

## 2021-11-16 NOTE — DISCHARGE SUMMARY
Antepartum  Discharge Summary     Patient ID:  Josiah Kilgore  [id number]  15 y.o.  1990    Admit date: 2021    Discharge date: 2021    Admission Diagnoses:    Patient Active Problem List   Diagnosis Code    Pregnancy, twins O35.36     uterine contractions in third trimester, antepartum O47.03    Cholestasis during pregnancy in third trimester O26.613, K83.1       Discharge Diagnoses: No discharge information exists for this patient. Patient Active Problem List   Diagnosis Code    Pregnancy, twins O35.36     uterine contractions in third trimester, antepartum O47.03    Cholestasis during pregnancy in third trimester O26.613, K83.1       Procedures for this admission: None    Hospital Course:  Patient admitted for threatened PTL. Received Nifedipine and BMZx2 with resolution of ctx. Cervical exam unchanged and fetal surveillance reassuring. Stable for discharge after 2nd BMZ with close follow up in office scheduled. Disposition: Home or self care    Discharged Condition: stable            Patient Instructions:   Current Discharge Medication List      CONTINUE these medications which have NOT CHANGED    Details   PNV IN.66/YRQMUMB fum/folic ac (PRENATAL PO) Take  by mouth. aspirin 81 mg chewable tablet Take 81 mg by mouth daily. ursodioL (ACTIGALL) 500 mg tablet Take 500 mg by mouth two (2) times a day. Activity: Activity as tolerated  Diet: Regular Diet    Follow-up with No orders of the defined types were placed in this encounter.        Signed:  Jack Calix MD  2021  9:25 AM

## 2021-11-18 LAB
BACTERIA SPEC CULT: NORMAL
SERVICE CMNT-IMP: NORMAL

## 2021-11-30 ENCOUNTER — HOSPITAL ENCOUNTER (EMERGENCY)
Age: 31
Discharge: HOME | End: 2021-11-30
Attending: OBSTETRICS & GYNECOLOGY | Admitting: OBSTETRICS & GYNECOLOGY
Payer: COMMERCIAL

## 2021-11-30 VITALS
TEMPERATURE: 98 F | BODY MASS INDEX: 40.22 KG/M2 | SYSTOLIC BLOOD PRESSURE: 122 MMHG | DIASTOLIC BLOOD PRESSURE: 79 MMHG | HEIGHT: 63 IN | OXYGEN SATURATION: 99 % | WEIGHT: 227 LBS | HEART RATE: 76 BPM

## 2021-11-30 PROCEDURE — 99285 EMERGENCY DEPT VISIT HI MDM: CPT

## 2021-11-30 PROCEDURE — 96360 HYDRATION IV INFUSION INIT: CPT

## 2021-11-30 PROCEDURE — 74011250636 HC RX REV CODE- 250/636: Performed by: STUDENT IN AN ORGANIZED HEALTH CARE EDUCATION/TRAINING PROGRAM

## 2021-11-30 RX ADMIN — SODIUM CHLORIDE, POTASSIUM CHLORIDE, SODIUM LACTATE AND CALCIUM CHLORIDE 1000 ML: 600; 310; 30; 20 INJECTION, SOLUTION INTRAVENOUS at 17:30

## 2021-11-30 NOTE — H&P
History & Physical    Name: Manolo Sweeney MRN: [id number]  SSN: [id number]    YOB: 1990  Age: 28 y.o. Sex: female        Subjective:     Estimated Date of Delivery: 21  OB History    Para Term  AB Living   1             SAB IAB Ectopic Molar Multiple Live Births                    # Outcome Date GA Lbr Harpreet/2nd Weight Sex Delivery Anes PTL Lv   1 Current                Ms. Jennifer Turner is a 28 y.o.  @ 36w2d with mo/di twin pregnancy presenting for rule out  labor. Was seen in clinic yesterday with concern for some fluid leaking and some mild contractions. During that visit nitrazine was positive but ferning and pooling negative. Since that time has stopped leaking but contractions have continued. She states they are only mildly uncomfortable but given twin pregnancy was concerned this may represent  labor. Has not needed tylenol. Good fetal movement. No bleeding. Pregnancy complicated by:  - mo/di twin pregnancy: weekly surveillance all reassuring and planned primary section at 37 weeks  - cholestasis diagnosed at 33 weeks, on ursodiol   - received BMZ @ 34wks, admitted with contractions     Past Medical History:   Diagnosis Date    Cholestasis during pregnancy in third trimester 2021     Past Surgical History:   Procedure Laterality Date    HX OTHER SURGICAL      adnoids when 6    HX OTHER SURGICAL      uterine polyps     Social History     Occupational History    Not on file   Tobacco Use    Smoking status: Never Smoker    Smokeless tobacco: Never Used   Substance and Sexual Activity    Alcohol use: Not Currently    Drug use: Never    Sexual activity: Not on file     History reviewed. No pertinent family history. No Known Allergies  Prior to Admission medications    Medication Sig Start Date End Date Taking? Authorizing Provider   PNV VV.27/YCYMQNA fum/folic ac (PRENATAL PO) Take  by mouth.    Yes Provider, Historical   aspirin 81 mg chewable tablet Take 81 mg by mouth daily. Yes Provider, Historical   ursodioL (ACTIGALL) 500 mg tablet Take 500 mg by mouth two (2) times a day. Yes Provider, Historical        Review of Systems: A comprehensive review of systems was negative except for that written in the HPI. Objective:     Vitals:  Vitals:    11/30/21 1612   Weight: 103 kg (227 lb)   Height: 5' 2.5\" (1.588 m)        Physical Exam:  Patient without distress. Membranes:  Intact  Fetal Heart Rate:    A: 140s/mod claire/+accels/no decels   B: 140s/mod claire/+accels/no decels     Cervix: 1/20/-4, unchanged from in office exam yesterday     Prenatal Labs:   No results found for: ABORH, RUBELLAEXT, GRBSEXT, HBSAGEXT, HIVEXT, RPREXT, GONNOEXT, CHLAMEXT, ABORHEXT, RUBELLAEXT, GRBSEXT, HBSAGEXT, HIVEXT, RPREXT, GONNOEXT, CHLAMEXT      Assessment/Plan:     Active Problems:    * No active hospital problems. *       Plan: Cervix unchanged from exam yesterday in office. Reassuring surveillance. Patient overall very comfortable appearing. Will monitor for 2 hours and plan for recheck. Cinthia Osborne MD  11/30/2021  5:23 PM       1900:    Patient rechecked and cervix unchanged. Overall very comfortable appearing in room. Having ctxs on monitor q 5 minutes but I was present in room during one and patient talking throughout. She lives 10 minutes from hospital. Reviewed return precautions. Will discharge home.      Cinthia Osborne MD  11/30/2021  7:04 PM

## 2021-11-30 NOTE — ROUTINE PROCESS
Pt is in for having contractions since 0930 today. Pt is G1 pregnant with twins at 43/2  1600- Assumed care from Lottie Rebolledo RN. Dr Rula Lemus in to see pt. No cx change since yesterday in office. Recommended po fluids. 1730- Pt continues to have contractions. MD notified order obtained for IVB started. 200- Dr Rula Lemus in assessed pt's cx. No change pt will be discharged to home. Discharge instructions tip sheet reviewed and given to pt. Pt verbalized understanding. 1910- Pt went home.

## 2021-12-01 NOTE — DISCHARGE INSTRUCTIONS
Patient Education        Jane Breeze Contractions: Care Instructions  Your Care Instructions     Estevan Ibrahim contractions prepare your uterus for labor. Think of them as a \"warm-up\" exercise that your body does. You may begin to feel them between the 28th and 30th weeks of your pregnancy. But they start as early as the 20th week. Estevan Ibrahim contractions usually occur more often during the ninth month. They may go away when you are active and return when you rest. These contractions are like mild contractions of true labor, but they occur less often. (You feel fewer than 8 in an hour.) They don't cause your cervix to open. It may be hard for you to tell the difference between Jane Breeze contractions and true labor, especially in your first pregnancy. Follow-up care is a key part of your treatment and safety. Be sure to make and go to all appointments, and call your doctor if you are having problems. It's also a good idea to know your test results and keep a list of the medicines you take. How can you care for yourself at home? · Try a warm bath to help relieve muscle tension and reduce pain. · Change positions every 30 minutes. Take breaks if you must sit for a long time. Get up and walk around. · Drink plenty of water. · Taking short walks may help you feel better. Your doctor needs to check any contractions that are getting stronger or closer together. Where can you learn more? Go to http://www.gray.com/  Enter Z402 in the search box to learn more about \"Estevan Ibrahim Contractions: Care Instructions. \"  Current as of: June 16, 2021               Content Version: 13.0  © 8034-7525 [employer], [employer]. Care instructions adapted under license by [employer] (which disclaims liability or warranty for this information).  If you have questions about a medical condition or this instruction, always ask your healthcare professional. Titus Carreon disclaims any warranty or liability for your use of this information.

## 2021-12-06 ENCOUNTER — HOSPITAL ENCOUNTER (INPATIENT)
Age: 31
LOS: 3 days | Discharge: HOME | End: 2021-12-09
Attending: OBSTETRICS & GYNECOLOGY | Admitting: OBSTETRICS & GYNECOLOGY
Payer: COMMERCIAL

## 2021-12-06 ENCOUNTER — ANESTHESIA (OUTPATIENT)
Dept: LABOR AND DELIVERY | Age: 31
End: 2021-12-06
Payer: COMMERCIAL

## 2021-12-06 ENCOUNTER — ANESTHESIA EVENT (OUTPATIENT)
Dept: LABOR AND DELIVERY | Age: 31
End: 2021-12-06
Payer: COMMERCIAL

## 2021-12-06 ENCOUNTER — HOSPITAL ENCOUNTER (OUTPATIENT)
Dept: PREADMISSION TESTING | Age: 31
Discharge: HOME | End: 2021-12-06

## 2021-12-06 DIAGNOSIS — Z98.891 S/P CESAREAN SECTION: Primary | ICD-10-CM

## 2021-12-06 PROBLEM — O47.03 PRETERM UTERINE CONTRACTIONS IN THIRD TRIMESTER, ANTEPARTUM: Status: RESOLVED | Noted: 2021-11-14 | Resolved: 2021-12-06

## 2021-12-06 PROBLEM — O32.9XX0 MULTIPLE GESTATION WITH ONE OR MORE FETAL MALPRESENTATIONS IN THIRD TRIMESTER: Status: ACTIVE | Noted: 2021-12-06

## 2021-12-06 PROBLEM — O30.009 PREGNANCY, TWINS: Status: RESOLVED | Noted: 2021-11-14 | Resolved: 2021-12-06

## 2021-12-06 PROBLEM — O30.009 TWIN PREGNANCY: Status: ACTIVE | Noted: 2021-12-06

## 2021-12-06 PROBLEM — O30.93 MULTIPLE GESTATION WITH ONE OR MORE FETAL MALPRESENTATIONS IN THIRD TRIMESTER: Status: ACTIVE | Noted: 2021-12-06

## 2021-12-06 PROBLEM — O30.009 TWIN PREGNANCY: Status: RESOLVED | Noted: 2021-12-06 | Resolved: 2021-12-06

## 2021-12-06 PROBLEM — O30.039 MONOCHORIONIC DIAMNIOTIC TWIN GESTATION: Status: ACTIVE | Noted: 2021-12-06

## 2021-12-06 LAB
ABO + RH BLD: NORMAL
AMPHET UR QL SCN: NEGATIVE
BARBITURATES UR QL SCN: NEGATIVE
BASOPHILS # BLD: 0 K/UL (ref 0–0.1)
BASOPHILS NFR BLD: 0 % (ref 0–1)
BENZODIAZ UR QL: NEGATIVE
BLOOD GROUP ANTIBODIES SERPL: NORMAL
CANNABINOIDS UR QL SCN: NEGATIVE
COCAINE UR QL SCN: NEGATIVE
COVID-19 RAPID TEST, COVR: NOT DETECTED
DIFFERENTIAL METHOD BLD: ABNORMAL
DRUG SCRN COMMENT,DRGCM: NORMAL
EOSINOPHIL # BLD: 0.2 K/UL (ref 0–0.4)
EOSINOPHIL NFR BLD: 2 % (ref 0–7)
ERYTHROCYTE [DISTWIDTH] IN BLOOD BY AUTOMATED COUNT: 15.6 % (ref 11.5–14.5)
HCT VFR BLD AUTO: 41.1 % (ref 35–47)
HGB BLD-MCNC: 13.2 G/DL (ref 11.5–16)
IMM GRANULOCYTES # BLD AUTO: 0.1 K/UL (ref 0–0.04)
IMM GRANULOCYTES NFR BLD AUTO: 1 % (ref 0–0.5)
LYMPHOCYTES # BLD: 2.5 K/UL (ref 0.8–3.5)
LYMPHOCYTES NFR BLD: 26 % (ref 12–49)
MCH RBC QN AUTO: 27.4 PG (ref 26–34)
MCHC RBC AUTO-ENTMCNC: 32.1 G/DL (ref 30–36.5)
MCV RBC AUTO: 85.3 FL (ref 80–99)
METHADONE UR QL: NEGATIVE
MONOCYTES # BLD: 0.6 K/UL (ref 0–1)
MONOCYTES NFR BLD: 6 % (ref 5–13)
NEUTS SEG # BLD: 6.5 K/UL (ref 1.8–8)
NEUTS SEG NFR BLD: 65 % (ref 32–75)
NRBC # BLD: 0 K/UL (ref 0–0.01)
NRBC BLD-RTO: 0 PER 100 WBC
OPIATES UR QL: NEGATIVE
PCP UR QL: NEGATIVE
PLATELET # BLD AUTO: 246 K/UL (ref 150–400)
PMV BLD AUTO: 11.1 FL (ref 8.9–12.9)
RBC # BLD AUTO: 4.82 M/UL (ref 3.8–5.2)
SOURCE, COVRS: NORMAL
SPECIMEN EXP DATE BLD: NORMAL
WBC # BLD AUTO: 9.9 K/UL (ref 3.6–11)

## 2021-12-06 PROCEDURE — 76060000078 HC EPIDURAL ANESTHESIA: Performed by: OBSTETRICS & GYNECOLOGY

## 2021-12-06 PROCEDURE — 99285 EMERGENCY DEPT VISIT HI MDM: CPT

## 2021-12-06 PROCEDURE — 74011250636 HC RX REV CODE- 250/636: Performed by: ANESTHESIOLOGY

## 2021-12-06 PROCEDURE — 77010026065 HC OXYGEN MINIMUM MEDICAL AIR

## 2021-12-06 PROCEDURE — 76060000033 HC ANESTHESIA 1 TO 1.5 HR: Performed by: OBSTETRICS & GYNECOLOGY

## 2021-12-06 PROCEDURE — 74011250636 HC RX REV CODE- 250/636

## 2021-12-06 PROCEDURE — 74011250636 HC RX REV CODE- 250/636: Performed by: OBSTETRICS & GYNECOLOGY

## 2021-12-06 PROCEDURE — 74011000250 HC RX REV CODE- 250: Performed by: OBSTETRICS & GYNECOLOGY

## 2021-12-06 PROCEDURE — 75410000002 HC LABOR FEE PER 1 HR

## 2021-12-06 PROCEDURE — 77030003666 HC NDL SPINAL BD -A: Performed by: ANESTHESIOLOGY

## 2021-12-06 PROCEDURE — 77030014125 HC TY EPDRL BBMI -B: Performed by: ANESTHESIOLOGY

## 2021-12-06 PROCEDURE — 85025 COMPLETE CBC W/AUTO DIFF WBC: CPT

## 2021-12-06 PROCEDURE — 88307 TISSUE EXAM BY PATHOLOGIST: CPT

## 2021-12-06 PROCEDURE — 86901 BLOOD TYPING SEROLOGIC RH(D): CPT

## 2021-12-06 PROCEDURE — 87635 SARS-COV-2 COVID-19 AMP PRB: CPT

## 2021-12-06 PROCEDURE — 76010000391 HC C SECN FIRST 1 HR: Performed by: OBSTETRICS & GYNECOLOGY

## 2021-12-06 PROCEDURE — 74011000250 HC RX REV CODE- 250: Performed by: ANESTHESIOLOGY

## 2021-12-06 PROCEDURE — 80307 DRUG TEST PRSMV CHEM ANLYZR: CPT

## 2021-12-06 PROCEDURE — 65410000002 HC RM PRIVATE OB

## 2021-12-06 PROCEDURE — 76010000392 HC C SECN EA ADDL 0.5 HR: Performed by: OBSTETRICS & GYNECOLOGY

## 2021-12-06 PROCEDURE — 75410000003 HC RECOV DEL/VAG/CSECN EA 0.5 HR

## 2021-12-06 PROCEDURE — 36415 COLL VENOUS BLD VENIPUNCTURE: CPT

## 2021-12-06 RX ORDER — MORPHINE SULFATE 0.5 MG/ML
INJECTION, SOLUTION EPIDURAL; INTRATHECAL; INTRAVENOUS AS NEEDED
Status: DISCONTINUED | OUTPATIENT
Start: 2021-12-06 | End: 2021-12-06 | Stop reason: HOSPADM

## 2021-12-06 RX ORDER — KETOROLAC TROMETHAMINE 30 MG/ML
30 INJECTION, SOLUTION INTRAMUSCULAR; INTRAVENOUS
Status: DISPENSED | OUTPATIENT
Start: 2021-12-06 | End: 2021-12-07

## 2021-12-06 RX ORDER — SODIUM CHLORIDE, SODIUM LACTATE, POTASSIUM CHLORIDE, CALCIUM CHLORIDE 600; 310; 30; 20 MG/100ML; MG/100ML; MG/100ML; MG/100ML
1000 INJECTION, SOLUTION INTRAVENOUS CONTINUOUS
Status: DISCONTINUED | OUTPATIENT
Start: 2021-12-06 | End: 2021-12-06

## 2021-12-06 RX ORDER — MISOPROSTOL 200 UG/1
TABLET ORAL
Status: DISCONTINUED
Start: 2021-12-06 | End: 2021-12-06

## 2021-12-06 RX ORDER — IBUPROFEN 400 MG/1
800 TABLET ORAL EVERY 8 HOURS
Status: DISCONTINUED | OUTPATIENT
Start: 2021-12-07 | End: 2021-12-09 | Stop reason: HOSPADM

## 2021-12-06 RX ORDER — OXYTOCIN/RINGER'S LACTATE 30/500 ML
87.3 PLASTIC BAG, INJECTION (ML) INTRAVENOUS AS NEEDED
Status: COMPLETED | OUTPATIENT
Start: 2021-12-06 | End: 2021-12-06

## 2021-12-06 RX ORDER — NALOXONE HYDROCHLORIDE 0.4 MG/ML
0.4 INJECTION, SOLUTION INTRAMUSCULAR; INTRAVENOUS; SUBCUTANEOUS AS NEEDED
Status: DISCONTINUED | OUTPATIENT
Start: 2021-12-06 | End: 2021-12-09 | Stop reason: HOSPADM

## 2021-12-06 RX ORDER — SODIUM CHLORIDE, SODIUM LACTATE, POTASSIUM CHLORIDE, CALCIUM CHLORIDE 600; 310; 30; 20 MG/100ML; MG/100ML; MG/100ML; MG/100ML
1000 INJECTION, SOLUTION INTRAVENOUS CONTINUOUS
Status: CANCELLED | OUTPATIENT
Start: 2021-12-06

## 2021-12-06 RX ORDER — SODIUM CHLORIDE, SODIUM LACTATE, POTASSIUM CHLORIDE, CALCIUM CHLORIDE 600; 310; 30; 20 MG/100ML; MG/100ML; MG/100ML; MG/100ML
125 INJECTION, SOLUTION INTRAVENOUS CONTINUOUS
Status: DISCONTINUED | OUTPATIENT
Start: 2021-12-06 | End: 2021-12-09 | Stop reason: HOSPADM

## 2021-12-06 RX ORDER — ONDANSETRON 4 MG/1
4 TABLET, ORALLY DISINTEGRATING ORAL
Status: DISCONTINUED | OUTPATIENT
Start: 2021-12-06 | End: 2021-12-09 | Stop reason: HOSPADM

## 2021-12-06 RX ORDER — BUPIVACAINE HYDROCHLORIDE 7.5 MG/ML
INJECTION, SOLUTION EPIDURAL; RETROBULBAR AS NEEDED
Status: DISCONTINUED | OUTPATIENT
Start: 2021-12-06 | End: 2021-12-06 | Stop reason: HOSPADM

## 2021-12-06 RX ORDER — SODIUM CHLORIDE 0.9 % (FLUSH) 0.9 %
5-40 SYRINGE (ML) INJECTION EVERY 8 HOURS
Status: DISCONTINUED | OUTPATIENT
Start: 2021-12-06 | End: 2021-12-07

## 2021-12-06 RX ORDER — SIMETHICONE 80 MG
80 TABLET,CHEWABLE ORAL AS NEEDED
Status: DISCONTINUED | OUTPATIENT
Start: 2021-12-06 | End: 2021-12-09 | Stop reason: HOSPADM

## 2021-12-06 RX ORDER — HYDROMORPHONE HYDROCHLORIDE 1 MG/ML
1 INJECTION, SOLUTION INTRAMUSCULAR; INTRAVENOUS; SUBCUTANEOUS
Status: DISCONTINUED | OUTPATIENT
Start: 2021-12-06 | End: 2021-12-09 | Stop reason: HOSPADM

## 2021-12-06 RX ORDER — ONDANSETRON 2 MG/ML
INJECTION INTRAMUSCULAR; INTRAVENOUS AS NEEDED
Status: DISCONTINUED | OUTPATIENT
Start: 2021-12-06 | End: 2021-12-06 | Stop reason: HOSPADM

## 2021-12-06 RX ORDER — DIPHENHYDRAMINE HCL 25 MG
25 CAPSULE ORAL
Status: DISCONTINUED | OUTPATIENT
Start: 2021-12-06 | End: 2021-12-09 | Stop reason: HOSPADM

## 2021-12-06 RX ORDER — SODIUM CHLORIDE, SODIUM LACTATE, POTASSIUM CHLORIDE, CALCIUM CHLORIDE 600; 310; 30; 20 MG/100ML; MG/100ML; MG/100ML; MG/100ML
INJECTION, SOLUTION INTRAVENOUS
Status: DISCONTINUED | OUTPATIENT
Start: 2021-12-06 | End: 2021-12-06 | Stop reason: HOSPADM

## 2021-12-06 RX ORDER — ZOLPIDEM TARTRATE 5 MG/1
5 TABLET ORAL
Status: DISCONTINUED | OUTPATIENT
Start: 2021-12-06 | End: 2021-12-09 | Stop reason: HOSPADM

## 2021-12-06 RX ORDER — OXYTOCIN/RINGER'S LACTATE 30/500 ML
10 PLASTIC BAG, INJECTION (ML) INTRAVENOUS AS NEEDED
Status: CANCELLED | OUTPATIENT
Start: 2021-12-06

## 2021-12-06 RX ORDER — TRANEXAMIC ACID 100 MG/ML
INJECTION, SOLUTION INTRAVENOUS AS NEEDED
Status: DISCONTINUED | OUTPATIENT
Start: 2021-12-06 | End: 2021-12-06 | Stop reason: HOSPADM

## 2021-12-06 RX ORDER — SODIUM CHLORIDE 0.9 % (FLUSH) 0.9 %
5-40 SYRINGE (ML) INJECTION AS NEEDED
Status: DISCONTINUED | OUTPATIENT
Start: 2021-12-06 | End: 2021-12-09 | Stop reason: HOSPADM

## 2021-12-06 RX ORDER — SODIUM CHLORIDE 0.9 % (FLUSH) 0.9 %
5-40 SYRINGE (ML) INJECTION AS NEEDED
Status: DISCONTINUED | OUTPATIENT
Start: 2021-12-06 | End: 2021-12-06

## 2021-12-06 RX ORDER — OXYCODONE HYDROCHLORIDE 5 MG/1
5 TABLET ORAL
Status: DISCONTINUED | OUTPATIENT
Start: 2021-12-06 | End: 2021-12-07

## 2021-12-06 RX ORDER — OXYTOCIN/RINGER'S LACTATE 30/500 ML
10 PLASTIC BAG, INJECTION (ML) INTRAVENOUS AS NEEDED
Status: DISCONTINUED | OUTPATIENT
Start: 2021-12-06 | End: 2021-12-06

## 2021-12-06 RX ORDER — CHOLECALCIFEROL (VITAMIN D3) 50 MCG
CAPSULE ORAL
COMMUNITY

## 2021-12-06 RX ORDER — OXYTOCIN/RINGER'S LACTATE 30/500 ML
87.3 PLASTIC BAG, INJECTION (ML) INTRAVENOUS AS NEEDED
Status: CANCELLED | OUTPATIENT
Start: 2021-12-06

## 2021-12-06 RX ORDER — NALOXONE HYDROCHLORIDE 0.4 MG/ML
0.2 INJECTION, SOLUTION INTRAMUSCULAR; INTRAVENOUS; SUBCUTANEOUS
Status: DISCONTINUED | OUTPATIENT
Start: 2021-12-06 | End: 2021-12-06

## 2021-12-06 RX ORDER — OXYTOCIN/RINGER'S LACTATE 30/500 ML
PLASTIC BAG, INJECTION (ML) INTRAVENOUS
Status: COMPLETED
Start: 2021-12-06 | End: 2021-12-06

## 2021-12-06 RX ORDER — TRANEXAMIC ACID 100 MG/ML
INJECTION, SOLUTION INTRAVENOUS
Status: COMPLETED
Start: 2021-12-06 | End: 2021-12-06

## 2021-12-06 RX ORDER — SODIUM CHLORIDE 0.9 % (FLUSH) 0.9 %
5-40 SYRINGE (ML) INJECTION EVERY 8 HOURS
Status: DISCONTINUED | OUTPATIENT
Start: 2021-12-06 | End: 2021-12-06

## 2021-12-06 RX ORDER — OXYTOCIN 10 [USP'U]/ML
INJECTION, SOLUTION INTRAMUSCULAR; INTRAVENOUS AS NEEDED
Status: DISCONTINUED | OUTPATIENT
Start: 2021-12-06 | End: 2021-12-06 | Stop reason: HOSPADM

## 2021-12-06 RX ADMIN — Medication 87.3 MILLI-UNITS/MIN: at 07:15

## 2021-12-06 RX ADMIN — SODIUM CHLORIDE 120 MCG: 900 INJECTION, SOLUTION INTRAVENOUS at 07:04

## 2021-12-06 RX ADMIN — OXYTOCIN 87.3 MILLI-UNITS/MIN: 10 INJECTION, SOLUTION INTRAMUSCULAR; INTRAVENOUS at 07:15

## 2021-12-06 RX ADMIN — SODIUM CHLORIDE, POTASSIUM CHLORIDE, SODIUM LACTATE AND CALCIUM CHLORIDE: 600; 310; 30; 20 INJECTION, SOLUTION INTRAVENOUS at 06:28

## 2021-12-06 RX ADMIN — SODIUM CHLORIDE, POTASSIUM CHLORIDE, SODIUM LACTATE AND CALCIUM CHLORIDE 125 ML/HR: 600; 310; 30; 20 INJECTION, SOLUTION INTRAVENOUS at 07:15

## 2021-12-06 RX ADMIN — ONDANSETRON HYDROCHLORIDE 4 MG: 2 INJECTION, SOLUTION INTRAMUSCULAR; INTRAVENOUS at 06:16

## 2021-12-06 RX ADMIN — SODIUM CHLORIDE 80 MCG: 900 INJECTION, SOLUTION INTRAVENOUS at 06:12

## 2021-12-06 RX ADMIN — TRANEXAMIC ACID 1 G: 100 INJECTION, SOLUTION INTRAVENOUS at 06:28

## 2021-12-06 RX ADMIN — AZITHROMYCIN MONOHYDRATE 500 MG: 500 INJECTION, POWDER, LYOPHILIZED, FOR SOLUTION INTRAVENOUS at 06:10

## 2021-12-06 RX ADMIN — SODIUM CHLORIDE, POTASSIUM CHLORIDE, SODIUM LACTATE AND CALCIUM CHLORIDE 125 ML/HR: 600; 310; 30; 20 INJECTION, SOLUTION INTRAVENOUS at 23:01

## 2021-12-06 RX ADMIN — SODIUM CHLORIDE 80 MCG: 900 INJECTION, SOLUTION INTRAVENOUS at 06:15

## 2021-12-06 RX ADMIN — SODIUM CHLORIDE 80 MCG: 900 INJECTION, SOLUTION INTRAVENOUS at 06:28

## 2021-12-06 RX ADMIN — SODIUM CHLORIDE, POTASSIUM CHLORIDE, SODIUM LACTATE AND CALCIUM CHLORIDE: 600; 310; 30; 20 INJECTION, SOLUTION INTRAVENOUS at 05:57

## 2021-12-06 RX ADMIN — SODIUM CHLORIDE 80 MCG: 900 INJECTION, SOLUTION INTRAVENOUS at 06:08

## 2021-12-06 RX ADMIN — SODIUM CHLORIDE, POTASSIUM CHLORIDE, SODIUM LACTATE AND CALCIUM CHLORIDE 1000 ML: 600; 310; 30; 20 INJECTION, SOLUTION INTRAVENOUS at 04:57

## 2021-12-06 RX ADMIN — SODIUM CHLORIDE 80 MCG: 900 INJECTION, SOLUTION INTRAVENOUS at 06:18

## 2021-12-06 RX ADMIN — WATER 2 G: 1 INJECTION INTRAMUSCULAR; INTRAVENOUS; SUBCUTANEOUS at 05:53

## 2021-12-06 RX ADMIN — OXYTOCIN 20 UNITS: 10 INJECTION, SOLUTION INTRAMUSCULAR; INTRAVENOUS at 06:28

## 2021-12-06 RX ADMIN — MORPHINE SULFATE 0.25 MG: 0.5 INJECTION, SOLUTION EPIDURAL; INTRATHECAL; INTRAVENOUS at 06:05

## 2021-12-06 RX ADMIN — SODIUM CHLORIDE, POTASSIUM CHLORIDE, SODIUM LACTATE AND CALCIUM CHLORIDE: 600; 310; 30; 20 INJECTION, SOLUTION INTRAVENOUS at 06:58

## 2021-12-06 RX ADMIN — SODIUM CHLORIDE, POTASSIUM CHLORIDE, SODIUM LACTATE AND CALCIUM CHLORIDE 125 ML/HR: 600; 310; 30; 20 INJECTION, SOLUTION INTRAVENOUS at 15:47

## 2021-12-06 RX ADMIN — BUPIVACAINE HYDROCHLORIDE 1.4 ML: 7.5 INJECTION, SOLUTION EPIDURAL; RETROBULBAR at 06:05

## 2021-12-06 RX ADMIN — SODIUM CHLORIDE 80 MCG: 900 INJECTION, SOLUTION INTRAVENOUS at 06:56

## 2021-12-06 NOTE — H&P
History & Physical    Name: Alka Cagle MRN: [id number]  SSN: [id number]    YOB: 1990  Age: 28 y.o. Sex: female      Subjective:     Reason for Admission:  Pregnancy and ROM/labor with twins    History of Present Illness: Alka Cagle is a 28 y.o.  female with an estimated gestational age of 42w4d with Estimated Date of Delivery: 21. Prenatal course complicated by M/D twin gestation, cholestasis in pregnancy and fetal malpresentation presents to L&D c/o loss of fluid this am with onset uterine contractions. Scheduled  delivery for malpresentation tomorrow. Good FM, no vaginal bleeding. OB History        1    Para        Term                AB        Living           SAB        IAB        Ectopic        Molar        Multiple        Live Births                  Past Medical History:   Diagnosis Date    Cholestasis during pregnancy in third trimester 2021     Past Surgical History:   Procedure Laterality Date    HX OTHER SURGICAL      adnoids when 6    HX OTHER SURGICAL      uterine polyps     Social History     Occupational History    Not on file   Tobacco Use    Smoking status: Never Smoker    Smokeless tobacco: Never Used   Substance and Sexual Activity    Alcohol use: Not Currently    Drug use: Never    Sexual activity: Not on file     Family History   Problem Relation Age of Onset    Hypertension Father     Heart Disease Father     Diabetes Maternal Grandmother     Stroke Maternal Grandmother     Hypertension Maternal Grandmother     Cancer Maternal Grandfather        No Known Allergies  Prior to Admission medications    Medication Sig Start Date End Date Taking? Authorizing Provider   B.infantis-B.ani-B.long-B.bifi (Probiotic 4X) 10-15 mg TbEC Take  by mouth. Yes Provider, Historical   PNV GX.67/SODQIOW fum/folic ac (PRENATAL PO) Take  by mouth.    Yes Provider, Historical   aspirin 81 mg chewable tablet Take 81 mg by mouth daily. Yes Provider, Historical   ursodioL (ACTIGALL) 500 mg tablet Take 500 mg by mouth two (2) times a day. Yes Provider, Historical      Review of Systems   Constitutional: Negative for chills and fever. Eyes: Negative for blurred vision. Respiratory: Negative for cough, hemoptysis and shortness of breath. Cardiovascular: Negative for chest pain, palpitations and leg swelling. Gastrointestinal: Positive for abdominal pain. Negative for nausea and vomiting. Genitourinary: Negative. Musculoskeletal: Negative. Skin: Positive for itching. Negative for rash. Neurological: Negative for headaches. Endo/Heme/Allergies: Negative. Psychiatric/Behavioral: Negative. Objective:       Vitals:  Visit Vitals  Ht 5' 2.5\" (1.588 m)   Wt 108.4 kg (239 lb)   LMP  (Exact Date)   BMI 43.02 kg/m²     No data recorded.       General: Well developed, Well nourished and No distress  HEENT: No thyromegaly, Pupils equal, Round and Reactive to light  Lungs: Clear to auscultation bilaterally, Normal breath sounds and Normal respiratory rate  Cardiac: Regular rate, Regular rhythm and No Murmur  Abdomen: No hepatosplenomegaly, Gravid and non tender and Presentation:Vtx and breech (US confirmed)  Pelvic: normal external female genitalia and without lesions  Extremities: No visible lesions, No edema and Edema:Numbers; edema: 3+  Neurologic: alert and oriented x 4, CN grossly intact, Non focal neurologic exam and DTR: REFLEXES: 2+      Presentation Vtx/BR   Cervix 2   Effacement 80%   Station   -1   Membranes ROM, Clear fluid   FHR Cat 1(x2)   Industry  q 3 Minutes       Prenatal labs:  Blood Type: O Pos  RPR:Nonreactive  Rubella: Immune  HIV:Negative  Glucola:Negative  GBS:Negative  HbsAg:Negative  Pap:Normal    Labs:   Recent Results (from the past 24 hour(s))   COVID-19 RAPID TEST    Collection Time: 12/06/21  4:29 AM   Result Value Ref Range    Specimen source Nasopharyngeal      COVID-19 rapid test Not detected NOTD     CBC WITH AUTOMATED DIFF    Collection Time: 21  4:30 AM   Result Value Ref Range    WBC 9.9 3.6 - 11.0 K/uL    RBC 4.82 3.80 - 5.20 M/uL    HGB 13.2 11.5 - 16.0 g/dL    HCT 41.1 35.0 - 47.0 %    MCV 85.3 80.0 - 99.0 FL    MCH 27.4 26.0 - 34.0 PG    MCHC 32.1 30.0 - 36.5 g/dL    RDW 15.6 (H) 11.5 - 14.5 %    PLATELET 529 499 - 936 K/uL    MPV 11.1 8.9 - 12.9 FL    NRBC 0.0 0  WBC    ABSOLUTE NRBC 0.00 0.00 - 0.01 K/uL    NEUTROPHILS 65 32 - 75 %    LYMPHOCYTES 26 12 - 49 %    MONOCYTES 6 5 - 13 %    EOSINOPHILS 2 0 - 7 %    BASOPHILS 0 0 - 1 %    IMMATURE GRANULOCYTES 1 (H) 0.0 - 0.5 %    ABS. NEUTROPHILS 6.5 1.8 - 8.0 K/UL    ABS. LYMPHOCYTES 2.5 0.8 - 3.5 K/UL    ABS. MONOCYTES 0.6 0.0 - 1.0 K/UL    ABS. EOSINOPHILS 0.2 0.0 - 0.4 K/UL    ABS. BASOPHILS 0.0 0.0 - 0.1 K/UL    ABS. IMM. GRANS. 0.1 (H) 0.00 - 0.04 K/UL    DF AUTOMATED         Patient Active Problem List   Diagnosis Code    Cholestasis during pregnancy in third trimester O26.613, K83.1    Monochorionic diamniotic twin gestation O34.200    Multiple gestation with one or more fetal malpresentations in third trimester O30.93, O27. 9XX0     Assessment and Plan:     28 y.o.  at 37w1d  Mono/Di twin gestation Vtx/Br with SROM. Cholestasis in pregnancy     Admit to L&D   Proceed with  Delivery as planned for multifetal pregnancy and malpresentation. I reviewed  the risks of surgery, the risks include infection, bleeding,  damage to organs including bowel, bladder, ureters, vessels, possible need for hysterectomy, blood transfusion, and future surgery to repair undiagnosed damage to organs. Questions were answered. Consent was signed.      Wally Chang MD          Signed By:  Wally Chang MD     2021 5:04 AM

## 2021-12-06 NOTE — ANESTHESIA PREPROCEDURE EVALUATION
Relevant Problems   GASTROINTESTINAL   (+) Cholestasis during pregnancy in third trimester       Anesthetic History   No history of anesthetic complications            Review of Systems / Medical History  Patient summary reviewed, nursing notes reviewed and pertinent labs reviewed    Pulmonary  Within defined limits                 Neuro/Psych   Within defined limits           Cardiovascular  Within defined limits                Exercise tolerance: >4 METS     GI/Hepatic/Renal  Within defined limits              Endo/Other        Obesity     Other Findings   Comments: 37 week twins         Physical Exam    Airway  Mallampati: I  TM Distance: 4 - 6 cm  Neck ROM: normal range of motion   Mouth opening: Normal     Cardiovascular  Regular rate and rhythm,  S1 and S2 normal,  no murmur, click, rub, or gallop  Rhythm: regular  Rate: normal         Dental  No notable dental hx       Pulmonary  Breath sounds clear to auscultation               Abdominal  GI exam deferred       Other Findings            Anesthetic Plan    ASA: 2  Anesthesia type: spinal            Anesthetic plan and risks discussed with: Patient

## 2021-12-06 NOTE — OP NOTES
Operative Note      Patient: Aditi Lockett         MRN: [id number]  [facility] Date: 2021  Attending:                    Rhoderick Cowden, MD      Date of Operation: 2021    Preoperative Diagnosis: 28 y.o.  at 42w4d                                               Twin pregnancy [O30.009]                                             Mono/Di twins            Fetal malpresentation    Postoperative Diagnosis:  As above                                                    Indication for Operation: 28 y.o.  at 42w4d  Br/VTX M/D twins in labor with SROM    Surgeon(s):  Tammy    Procedure: Primary Low Transverse  Section    Anesthesia: Spinal anesthesia    Anesthesiologist:       Estimated Blood Loss:  700cc    Pathology: Placentas    Findings: term born female baby A, Vtx, , Term born female baby B, . Normal female pelvic anatomy    Procedure Details: After consent was obtained a spinal block was placed per anesthesia. She was placed in a supine position with left lateral displacement. Hayes catheter was placed. Abdomen prepped with scrub solution. After assuring adequet regional anesthesia, a Pfannenstiel incision was made in the lower abdomen and carried sharply down to the fascia. Fascia scored in midline the carried laterally by sharp dissection. Rectus muscles were  in midline and the abdomen was bluntly entered. Bladder flap was created, bladder blade placed. Low transverse uterine incision was made carried to the membranes. Membranes sharply entered. Uterine incision carried laterally and superiorly by blunt dissection. Baby A vertex delivered followed by the remainder of the fetus. Cord cut after 30 second delay and baby handed to NICU team. Baby B delivered by footling breech extraction without difficulty. Cord cut after 30 second delay. Placenta manually removed. Uterus delivered onto abdominal surface. Uterine cavivity scraped with dry lap sponge. Uterine incision closed with 0 Vicryl running locking suture. Second imbricating suture used. ) chromic figure of 8 used for  2 bleeding sites. Vesicouterine peritoneal fold closed with 3-0 Vicryl. Uterus delivered into abdominal cavity. Final inspection of hysterotomy site hemostatic. Parietal peritoneum closed with 2-0 Vicryl. Muscle inspected, hemostatic. Fascia closed with 0 Vicryl. Irrigation of SQ layer performed. Insorb used to close skin. Dermabond and bandages applied. Sent to RR in good condition. All counts correct. Zithromax 500mg and Ancef 2gm infused prior to onset of surgery.        Derrick Shelton MD  Date of operation:  12/6/2021        Date of dictation:  12/6/2021

## 2021-12-06 NOTE — PROGRESS NOTES
Nutrition: [facility] referral received for multigestation. Admitted with twin pregnancy and SROM. Now s/p delivery via  section. Nutrition assessment not indicated at this time. RD available by consult if needs arise. Thanks.   Vladimir Cox RD

## 2021-12-06 NOTE — PROGRESS NOTES
Post-Operative  Day 0    Tejal Mitchell     Assessment: Post-Op day 0, stable from [facility] for mo/di twins (vtx/br) and term labor with SROM. Plan:     - Routine post-operative care. - N/A   - Will check post-op Hgb tomorrow. Starting 13.2. Plan to start Fe at discharge if pt anemic. Information for the patient's :  Ree Morrison [[id number]]   , Low Transverse   Information for the patient's :  Ree Morrison [[id number]]   , Low Transverse     Patient doing well without significant complaint. Just had c/s several hours ago, still numb from spinal with shi in place. Pain is controlled. No nausea/vomiting, hasn't started a diet yet. Bleeding is normal.      Vitals:  Visit Vitals  BP (!) 101/56   Pulse 76   Resp 14   Ht 5' 2.5\" (1.588 m)   Wt 108.4 kg (239 lb)   LMP  (Exact Date)   SpO2 97%   Breastfeeding Unknown   BMI 43.02 kg/m²     No data recorded. Last 24hr Input/Output:    Intake/Output Summary (Last 24 hours) at 2021 0949  Last data filed at 2021 0658  Gross per 24 hour   Intake 1250 ml   Output    Net 1250 ml          Exam:     Patient without distress. Fundus firm, nontender per nursing fundal checks. Incision bandaged, clean, dry, intact. Perineum with normal lochia noted per nursing assessment. Lower extremities are negative for pathological edema.     Labs:       Recent Results (from the past 24 hour(s))   COVID-19 RAPID TEST    Collection Time: 21  4:29 AM   Result Value Ref Range    Specimen source Nasopharyngeal      COVID-19 rapid test Not detected NOTD     TYPE & SCREEN    Collection Time: 21  4:29 AM   Result Value Ref Range    Crossmatch Expiration 2021,8099     ABO/Rh(D) Delgado [place] POSITIVE     Antibody screen NEG    CBC WITH AUTOMATED DIFF    Collection Time: 21  4:30 AM   Result Value Ref Range    WBC 9.9 3.6 - 11.0 K/uL    RBC 4.82 3.80 - 5.20 M/uL HGB 13.2 11.5 - 16.0 g/dL    HCT 41.1 35.0 - 47.0 %    MCV 85.3 80.0 - 99.0 FL    MCH 27.4 26.0 - 34.0 PG    MCHC 32.1 30.0 - 36.5 g/dL    RDW 15.6 (H) 11.5 - 14.5 %    PLATELET 763 919 - 059 K/uL    MPV 11.1 8.9 - 12.9 FL    NRBC 0.0 0  WBC    ABSOLUTE NRBC 0.00 0.00 - 0.01 K/uL    NEUTROPHILS 65 32 - 75 %    LYMPHOCYTES 26 12 - 49 %    MONOCYTES 6 5 - 13 %    EOSINOPHILS 2 0 - 7 %    BASOPHILS 0 0 - 1 %    IMMATURE GRANULOCYTES 1 (H) 0.0 - 0.5 %    ABS. NEUTROPHILS 6.5 1.8 - 8.0 K/UL    ABS. LYMPHOCYTES 2.5 0.8 - 3.5 K/UL    ABS. MONOCYTES 0.6 0.0 - 1.0 K/UL    ABS. EOSINOPHILS 0.2 0.0 - 0.4 K/UL    ABS. BASOPHILS 0.0 0.0 - 0.1 K/UL    ABS. IMM.  GRANS. 0.1 (H) 0.00 - 0.04 K/UL    DF AUTOMATED     DRUG SCREEN, URINE    Collection Time: 12/06/21  4:30 AM   Result Value Ref Range    AMPHETAMINES Negative NEG      BARBITURATES Negative NEG      BENZODIAZEPINES Negative NEG      COCAINE Negative NEG      METHADONE Negative NEG      OPIATES Negative NEG      PCP(PHENCYCLIDINE) Negative NEG      THC (TH-CANNABINOL) Negative NEG      Drug screen comment (NOTE)

## 2021-12-06 NOTE — ANESTHESIA PROCEDURE NOTES
Spinal Block    Start time: 12/6/2021 6:00 AM  End time: 12/6/2021 6:05 AM  Performed by: Jimmy Thomson MD  Authorized by: Jimmy Thomson MD     Pre-procedure: Indications: at surgeon's request and primary anesthetic  Preanesthetic Checklist: patient identified, risks and benefits discussed, anesthesia consent, site marked, patient being monitored and timeout performed      Spinal Block:   Patient Position:  Seated  Prep Region:  Lumbar  Prep: chlorhexidine      Location:  L2-3  Technique:  Single shot        Needle:   Needle Type:   Vy  Needle Gauge:  25 G  Attempts:  1      Events: CSF confirmed, no blood with aspiration and no paresthesia        Assessment:  Insertion:  Uncomplicated  Patient tolerance:  Patient tolerated the procedure well with no immediate complications

## 2021-12-07 LAB
BASOPHILS # BLD: 0 K/UL (ref 0–0.1)
BASOPHILS NFR BLD: 0 % (ref 0–1)
DIFFERENTIAL METHOD BLD: ABNORMAL
EOSINOPHIL # BLD: 0.1 K/UL (ref 0–0.4)
EOSINOPHIL NFR BLD: 1 % (ref 0–7)
ERYTHROCYTE [DISTWIDTH] IN BLOOD BY AUTOMATED COUNT: 15.7 % (ref 11.5–14.5)
HCT VFR BLD AUTO: 33.9 % (ref 35–47)
HGB BLD-MCNC: 10.9 G/DL (ref 11.5–16)
IMM GRANULOCYTES # BLD AUTO: 0.1 K/UL (ref 0–0.04)
IMM GRANULOCYTES NFR BLD AUTO: 1 % (ref 0–0.5)
LYMPHOCYTES # BLD: 2.3 K/UL (ref 0.8–3.5)
LYMPHOCYTES NFR BLD: 19 % (ref 12–49)
MCH RBC QN AUTO: 27.7 PG (ref 26–34)
MCHC RBC AUTO-ENTMCNC: 32.2 G/DL (ref 30–36.5)
MCV RBC AUTO: 86 FL (ref 80–99)
MONOCYTES # BLD: 0.8 K/UL (ref 0–1)
MONOCYTES NFR BLD: 7 % (ref 5–13)
NEUTS SEG # BLD: 9.1 K/UL (ref 1.8–8)
NEUTS SEG NFR BLD: 72 % (ref 32–75)
NRBC # BLD: 0 K/UL (ref 0–0.01)
NRBC BLD-RTO: 0 PER 100 WBC
PLATELET # BLD AUTO: 217 K/UL (ref 150–400)
PMV BLD AUTO: 10.8 FL (ref 8.9–12.9)
RBC # BLD AUTO: 3.94 M/UL (ref 3.8–5.2)
WBC # BLD AUTO: 12.5 K/UL (ref 3.6–11)

## 2021-12-07 PROCEDURE — 74011250636 HC RX REV CODE- 250/636: Performed by: OBSTETRICS & GYNECOLOGY

## 2021-12-07 PROCEDURE — 74011250637 HC RX REV CODE- 250/637: Performed by: OBSTETRICS & GYNECOLOGY

## 2021-12-07 PROCEDURE — 85025 COMPLETE CBC W/AUTO DIFF WBC: CPT

## 2021-12-07 PROCEDURE — 36415 COLL VENOUS BLD VENIPUNCTURE: CPT

## 2021-12-07 PROCEDURE — 65410000002 HC RM PRIVATE OB

## 2021-12-07 RX ORDER — OXYCODONE HYDROCHLORIDE 5 MG/1
10 TABLET ORAL
Status: DISCONTINUED | OUTPATIENT
Start: 2021-12-07 | End: 2021-12-09 | Stop reason: HOSPADM

## 2021-12-07 RX ADMIN — KETOROLAC TROMETHAMINE 30 MG: 30 INJECTION, SOLUTION INTRAMUSCULAR; INTRAVENOUS at 06:07

## 2021-12-07 RX ADMIN — KETOROLAC TROMETHAMINE 30 MG: 30 INJECTION, SOLUTION INTRAMUSCULAR; INTRAVENOUS at 00:28

## 2021-12-07 RX ADMIN — OXYCODONE 5 MG: 5 TABLET ORAL at 09:27

## 2021-12-07 RX ADMIN — Medication 10 ML: at 00:29

## 2021-12-07 RX ADMIN — OXYCODONE 5 MG: 5 TABLET ORAL at 18:37

## 2021-12-07 RX ADMIN — IBUPROFEN 800 MG: 400 TABLET, FILM COATED ORAL at 14:07

## 2021-12-07 RX ADMIN — OXYCODONE 5 MG: 5 TABLET ORAL at 14:07

## 2021-12-07 RX ADMIN — IBUPROFEN 800 MG: 400 TABLET, FILM COATED ORAL at 22:15

## 2021-12-07 RX ADMIN — OXYCODONE 10 MG: 5 TABLET ORAL at 22:15

## 2021-12-07 RX ADMIN — Medication 10 ML: at 06:07

## 2021-12-07 NOTE — PROGRESS NOTES
Bedside and Verbal shift change report given to JAMIE Harden RN (oncoming nurse) by JAMIE Hernández  (offgoing nurse). Report included the following information SBAR, Kardex, Procedure Summary, Intake/Output, MAR and Recent Results.

## 2021-12-07 NOTE — ANESTHESIA POSTPROCEDURE EVALUATION
* No procedures listed *.    spinal    Anesthesia Post Evaluation        Patient location during evaluation: PACU  Note status: Adequate. Level of consciousness: responsive to verbal stimuli and sleepy but conscious  Pain management: satisfactory to patient  Airway patency: patent  Anesthetic complications: no  Cardiovascular status: acceptable  Respiratory status: acceptable  Hydration status: acceptable  Comments: +Post-Anesthesia Evaluation and Assessment    Patient: Agustina Rodas MRN: [id number]  SSN: [id number]   YOB: 1990  Age: 28 y.o. Sex: female      Cardiovascular Function/Vital Signs    BP (!) 107/59 (BP 1 Location: Left upper arm)   Pulse 80   Temp 36.4 °C (97.5 °F)   Resp 16   Ht 5' 2.5\" (1.588 m)   Wt 108.4 kg (239 lb)   SpO2 97%   Breastfeeding Unknown   BMI 43.02 kg/m²     Patient is status post * No procedures listed *. Nausea/Vomiting: Controlled. Postoperative hydration reviewed and adequate. Pain:  Pain Scale 1: Numeric (0 - 10) (12/07/21 0700)  Pain Intensity 1: 5 (12/07/21 0700)   Managed. Neurological Status:   Neuro (WDL): Within Defined Limits (12/07/21 0020)   At baseline. Mental Status and Level of Consciousness: Arousable. Pulmonary Status:   O2 Device: None (Room air) (12/07/21 0440)   Adequate oxygenation and airway patent. Complications related to anesthesia: None    Post-anesthesia assessment completed. No concerns. Signed By: Pascual Mukherjee MD    12/7/2021  Post anesthesia nausea and vomiting:  controlled      INITIAL Post-op Vital signs: No vitals data found for the desired time range.

## 2021-12-07 NOTE — PROGRESS NOTES
1925:  Bedside and Verbal shift change report given to EREN Gallego RN (oncoming nurse) by CHRISTIAN Rincon RN (offgoing nurse). Report included the following information SBAR, Kardex, Intake/Output, MAR and Recent Results. 0520:  Bedside and Verbal shift change report given to JAMIE Marlow RN (oncoming nurse) by EREN Gallego RN (offgoing nurse). Report included the following information SBAR, Kardex, Intake/Output, MAR and Recent Results.

## 2021-12-07 NOTE — PROGRESS NOTES
Duramorph Follow-Up Note    1 Day Post-Op sp Procedure(s):   SECTION. BP (!) 107/59 (BP 1 Location: Left upper arm)   Pulse 80   Temp 36.4 °C (97.5 °F)   Resp 16   Ht 5' 2.5\" (1.588 m)   Wt 108.4 kg (239 lb)   LMP  (Exact Date)   SpO2 97%   Breastfeeding Unknown   BMI 43.02 kg/m² . Patient is POD-1 S/P intrathecal duramorph. Pain is well controlled  Patient reports no headache, fever, weakness or numbness. Epidural/spinal tap site is clean, dry and intact. No obvious Anesthesia complications noted. Plan:    Pain management as per primary service.

## 2021-12-07 NOTE — PROGRESS NOTES
Post-Operative  Day 1    Tejal Mitchell     Assessment: Post-Op day 1 from [facility] for mo/do twins in term labor   Plan:     - Routine post-operative care. - N/A   - Postop hemoglobin stable. Plan to start Fe at discharge if pt anemic.  - Ambulate today. Information for the patient's :  Grisel Guzman [[id number]]   , Low Transverse   Information for the patient's :  Deannsharathmary Megan [[id number]]   , Low Transverse     Patient doing well without significant complaint. Tolerating diet. Hayes out. Ambulating. Doing well with twins, adjusting. Vitals:  Visit Vitals  /70 (BP 1 Location: Left upper arm, BP Patient Position: Sitting)   Pulse 90   Temp 98.3 °F (36.8 °C)   Resp 16   Ht 5' 2.5\" (1.588 m)   Wt 108.4 kg (239 lb)   LMP  (Exact Date)   SpO2 97%   Breastfeeding Unknown   BMI 43.02 kg/m²     Temp (24hrs), Av.9 °F (36.6 °C), Min:97.5 °F (36.4 °C), Max:98.3 °F (36.8 °C)      Last 24hr Input/Output:    Intake/Output Summary (Last 24 hours) at 2021 1727  Last data filed at 2021 1500  Gross per 24 hour   Intake 1000 ml   Output 4075 ml   Net -3075 ml          Exam:     Patient without distress. Fundus firm, nontender per nursing fundal checks. Incision bandaged, clean, dry, intact. Perineum with normal lochia noted per nursing assessment. Lower extremities are negative for pathological edema.     Labs:     Hgb: 13.2 --> 10.9

## 2021-12-08 PROCEDURE — 74011250637 HC RX REV CODE- 250/637: Performed by: OBSTETRICS & GYNECOLOGY

## 2021-12-08 PROCEDURE — 65410000002 HC RM PRIVATE OB

## 2021-12-08 RX ADMIN — IBUPROFEN 800 MG: 400 TABLET, FILM COATED ORAL at 13:52

## 2021-12-08 RX ADMIN — OXYCODONE 10 MG: 5 TABLET ORAL at 18:21

## 2021-12-08 RX ADMIN — OXYCODONE 10 MG: 5 TABLET ORAL at 02:41

## 2021-12-08 RX ADMIN — OXYCODONE 10 MG: 5 TABLET ORAL at 08:20

## 2021-12-08 RX ADMIN — IBUPROFEN 800 MG: 400 TABLET, FILM COATED ORAL at 06:00

## 2021-12-08 RX ADMIN — OXYCODONE 10 MG: 5 TABLET ORAL at 12:45

## 2021-12-08 RX ADMIN — OXYCODONE 5 MG: 5 TABLET ORAL at 23:08

## 2021-12-08 RX ADMIN — IBUPROFEN 800 MG: 400 TABLET, FILM COATED ORAL at 22:05

## 2021-12-08 NOTE — ROUTINE PROCESS
Bedside and Verbal shift change report given to TAYLOR Cordova RN (oncoming nurse) by JAMIE Harden RN (offgoing nurse). Report included the following information SBAR, Kardex, Intake/Output and MAR.

## 2021-12-08 NOTE — PROGRESS NOTES
Post-Operative  Day 2    Tejal Mitchell     Assessment: Post-Op day 2 from [facility] for mo/do twins in term labor   Plan:     - Routine post-operative care. - N/A   - Postop hemoglobin stable. Plan to start Fe at discharge if pt anemic.  - Ambulate today. Information for the patient's :  Jess Ansari [[id number]]   , Low Transverse   Information for the patient's :  Jess Ansari [[id number]]   , Low Transverse     Patient doing well without significant complaint. Tolerating diet. Hayes out. Ambulating. Passing flatus. Vitals:  Visit Vitals  /63 (BP 1 Location: Left upper arm, BP Patient Position: At rest;Sitting)   Pulse 86   Temp 98.6 °F (37 °C)   Resp 16   Ht 5' 2.5\" (1.588 m)   Wt 108.4 kg (239 lb)   LMP  (Exact Date)   SpO2 98%   Breastfeeding Unknown   BMI 43.02 kg/m²     Temp (24hrs), Av.3 °F (36.8 °C), Min:97.5 °F (36.4 °C), Max:98.9 °F (37.2 °C)      Last 24hr Input/Output:    Intake/Output Summary (Last 24 hours) at 2021 1120  Last data filed at 2021 1500  Gross per 24 hour   Intake    Output 900 ml   Net -900 ml          Exam:     Patient without distress. Fundus firm, nontender per nursing fundal checks. Incision bandaged, clean, dry, intact. Perineum with normal lochia noted per nursing assessment. Lower extremities are negative for pathological edema.     Labs:     Hgb: 13.2 --> 10.9     Galilea Cancino MD

## 2021-12-08 NOTE — PROGRESS NOTES
1915. Bedside shift change report given to TAYLOR Hairston RN (oncoming nurse) by JAMIE Harden RN (offgoing nurse). Report included the following information SBAR, Kardex, Procedure Summary, Intake/Output, MAR and Recent Results. 0725. Bedside shift change report given to SARANYA Sandhu RN (oncoming nurse) by Deyvi Corado. Nelson Hairston RN (offgoing nurse). Report included the following information SBAR, Kardex, Procedure Summary, Intake/Output, MAR and Recent Results.

## 2021-12-09 VITALS
HEIGHT: 63 IN | RESPIRATION RATE: 18 BRPM | SYSTOLIC BLOOD PRESSURE: 119 MMHG | HEART RATE: 88 BPM | DIASTOLIC BLOOD PRESSURE: 62 MMHG | WEIGHT: 239 LBS | OXYGEN SATURATION: 98 % | BODY MASS INDEX: 42.35 KG/M2 | TEMPERATURE: 97.4 F

## 2021-12-09 PROCEDURE — 74011250637 HC RX REV CODE- 250/637: Performed by: OBSTETRICS & GYNECOLOGY

## 2021-12-09 RX ORDER — DOCUSATE SODIUM 100 MG/1
100 CAPSULE, LIQUID FILLED ORAL
Qty: 60 CAPSULE | Refills: 0 | Status: SHIPPED | OUTPATIENT
Start: 2021-12-09

## 2021-12-09 RX ORDER — OXYCODONE AND ACETAMINOPHEN 5; 325 MG/1; MG/1
1 TABLET ORAL
Qty: 30 TABLET | Refills: 0 | Status: SHIPPED | OUTPATIENT
Start: 2021-12-09 | End: 2021-12-16

## 2021-12-09 RX ORDER — IBUPROFEN 600 MG/1
600 TABLET ORAL
Qty: 60 TABLET | Refills: 0 | Status: SHIPPED | OUTPATIENT
Start: 2021-12-09

## 2021-12-09 RX ADMIN — OXYCODONE 5 MG: 5 TABLET ORAL at 03:11

## 2021-12-09 RX ADMIN — OXYCODONE 5 MG: 5 TABLET ORAL at 08:32

## 2021-12-09 RX ADMIN — IBUPROFEN 800 MG: 400 TABLET, FILM COATED ORAL at 06:20

## 2021-12-09 RX ADMIN — OXYCODONE 10 MG: 5 TABLET ORAL at 12:28

## 2021-12-09 NOTE — PROGRESS NOTES
Post-Operative  Day 3    Tejal Mitchell       Assessment: Post-Op day 3, doing well  From [facility] for mo/di twins p term labor. Hgb 13.2>10.9. Plan:   - Discharge home today. - Follow up in office in 6 week(s) with [facility].  - Pain medication prescription(s) sent. - Questions answered. Information for the patient's :  Herschel Meigs [[id number]]   , Low Transverse   Information for the patient's :  Herschel Meigs [[id number]]   , Low Transverse    Patient doing well without significant complaint. Tolerating regular diet. Ambulating. Voiding without difficulty. Vitals:  Visit Vitals  /68   Pulse 76   Temp 97.6 °F (36.4 °C)   Resp 16   Ht 5' 2.5\" (1.588 m)   Wt 108.4 kg (239 lb)   LMP  (Exact Date)   SpO2 98%   Breastfeeding Unknown   BMI 43.02 kg/m²     Temp (24hrs), Av.8 °F (36.6 °C), Min:97.2 °F (36.2 °C), Max:98.5 °F (36.9 °C)        Exam:       Patient without distress. Fundus firm, nontender per nursing fundal checks. Incision clean, dry, intact. Perineum with normal lochia noted per nursing assessment. Lower extremities are negative for pathological edema. Labs:     No results found for this or any previous visit (from the past 24 hour(s)).

## 2021-12-09 NOTE — ROUTINE PROCESS
Bedside and Verbal shift change report given to Michelle Rosas RN (oncoming nurse) by SKYLAR Aldana Mt, RN (offgoing nurse). Report included the following information SBAR, Kardex, Intake/Output, MAR and Recent Results.

## 2021-12-09 NOTE — DISCHARGE SUMMARY
Obstetrical Discharge Summary     Name: Aretha Lemon MRN: [id number]  SSN: [id number]    YOB: 1990  Age: 28 y.o. Sex: female      Admit Date: 2021    Discharge Date: 2021     Admitting Physician: Jordan Corbett MD     Attending Physician:  Tray Vera MD     Admission Diagnoses: Twin pregnancy [O30.009]    Discharge Diagnoses:   Information for the patient's :  Terese Prince [[id number]]   Delivery of a 2.695 kg female infant via , Low Transverse on 2021 at 6:26 AM  by Jordan Corbett. Apgars were 9  and 9 . Information for the patient's :  Terese Prince [[id number]]   Delivery of a 2.495 kg female infant via , Low Transverse on 2021 at 6:27 AM  by Jordan Corbett. Apgars were 8  and 9 . Additional Diagnoses:   Hospital Problems  Date Reviewed: 2021          Codes Class Noted POA    Monochorionic diamniotic twin gestation ICD-10-CM: O30.039  ICD-9-CM: 651.00, V91.02  2021 Yes        Multiple gestation with one or more fetal malpresentations in third trimester ICD-10-CM: O30.93, O32. 9XX0  ICD-9-CM: 652.63  2021 Yes             Lab Results   Component Value Date/Time    Rubella, External Immune 6.33 05/10/2021 12:00 AM    GrBStrep, External Negative 11/15/2021 12:00 AM       Hospital Course: [facility] for mo/di twins. Hgb 13.2>10.9. Normal hospital course following the delivery. Patient Instructions:   Current Discharge Medication List      START taking these medications    Details   ibuprofen (MOTRIN) 600 mg tablet Take 1 Tablet by mouth every six (6) hours as needed for Pain. Qty: 60 Tablet, Refills: 0      docusate sodium (Colace) 100 mg capsule Take 1 Capsule by mouth two (2) times daily as needed for Constipation for up to 30 doses.   Qty: 60 Capsule, Refills: 0      oxyCODONE-acetaminophen (Percocet) 5-325 mg per tablet Take 1 Tablet by mouth every four (4) hours as needed for Pain for up to 7 days. Max Daily Amount: 6 Tablets. Qty: 30 Tablet, Refills: 0    Associated Diagnoses: S/P  section         CONTINUE these medications which have NOT CHANGED    Details   B.infantis-B.ani-B.long-B.bifi (Probiotic 4X) 10-15 mg TbEC Take  by mouth. PNV II.51/YHBZWRY fum/folic ac (PRENATAL PO) Take  by mouth. STOP taking these medications       aspirin 81 mg chewable tablet Comments:   Reason for Stopping:         ursodioL (ACTIGALL) 500 mg tablet Comments:   Reason for Stopping:               Disposition at Discharge: Home or self care    Condition at Discharge: Stable    Reference my discharge instructions.     Follow-up Appointments   Procedures    FOLLOW UP VISIT Appointment in: 6 Weeks     Standing Status:   Standing     Number of Occurrences:   1     Order Specific Question:   Appointment in     Answer:   6 Weeks        Signed By:  Margarita Adhikari MD     2021

## 2021-12-09 NOTE — DISCHARGE INSTRUCTIONS
Patient Education         Section: What to Expect at Home  Your Recovery     A  section, or , is surgery to deliver your baby through a cut that the doctor makes in your lower belly and uterus. The cut is called an incision. You may have some pain in your lower belly and need pain medicine for 1 to 2 weeks. You can expect some vaginal bleeding for several weeks. You will probably need about 6 weeks to fully recover. It's important to take it easy while the incision heals. Avoid heavy lifting, strenuous activities, and exercises that strain the belly muscles while you recover. Ask a family member or friend for help with housework, cooking, and shopping. This care sheet gives you a general idea about how long it will take for you to recover. But each person recovers at a different pace. Follow the steps below to get better as quickly as possible. How can you care for yourself at home? Activity    · Rest when you feel tired. Getting enough sleep will help you recover.     · Try to walk each day. Start by walking a little more than you did the day before. Bit by bit, increase the amount you walk. Walking boosts blood flow and helps prevent pneumonia, constipation, and blood clots.     · Avoid strenuous activities, such as bicycle riding, jogging, weightlifting, and aerobic exercise, for 6 weeks or until your doctor says it is okay.     · Until your doctor says it is okay, do not lift anything heavier than your baby.     · Do not do sit-ups or other exercises that strain the belly muscles for 6 weeks or until your doctor says it is okay.     · Hold a pillow over your incision when you cough or take deep breaths. This will support your belly and decrease your pain.     · You may shower as usual. Pat the incision dry when you are done.     · You will have some vaginal bleeding. Wear sanitary pads.  Do not douche or use tampons until your doctor says it is okay.     · Ask your doctor when you can drive again.     · You will probably need to take at least 6 weeks off work. It depends on the type of work you do and how you feel.     · Ask your doctor when it is okay for you to have sex. Diet    · You can eat your normal diet. If your stomach is upset, try bland, low-fat foods like plain rice, broiled chicken, toast, and yogurt.     · Drink plenty of fluids (unless your doctor tells you not to).     · You may notice that your bowel movements are not regular right after your surgery. This is common. Try to avoid constipation and straining with bowel movements. You may want to take a fiber supplement every day. If you have not had a bowel movement after a couple of days, ask your doctor about taking a mild laxative.     · If you are breastfeeding, limit alcohol. Alcohol can cause a lack of energy and other health problems for the baby when a breastfeeding woman drinks heavily. It can also get in the way of a mom's ability to feed her baby or to care for the child in other ways. There isn't a lot of research about exactly how much alcohol can harm a baby. Having no alcohol is the safest choice for your baby. If you choose to have a drink now and then, have only one drink, and limit the number of occasions that you have a drink. Wait to breastfeed at least 2 hours after you have a drink to reduce the amount of alcohol the baby may get in the milk. Medicines    · Your doctor will tell you if and when you can restart your medicines. He or she will also give you instructions about taking any new medicines.     · If you take aspirin or some other blood thinner, ask your doctor if and when to start taking it again. Make sure that you understand exactly what your doctor wants you to do.     · Take pain medicines exactly as directed. ? If the doctor gave you a prescription medicine for pain, take it as prescribed.   ? If you are not taking a prescription pain medicine, ask your doctor if you can take an over-the-counter medicine.     · If you think your pain medicine is making you sick to your stomach:  ? Take your medicine after meals (unless your doctor has told you not to). ? Ask your doctor for a different pain medicine.     · If your doctor prescribed antibiotics, take them as directed. Do not stop taking them just because you feel better. You need to take the full course of antibiotics. Incision care    · If you have strips of tape on the incision, leave the tape on for a week or until it falls off.     · Wash the area daily with warm, soapy water, and pat it dry. Don't use hydrogen peroxide or alcohol, which can slow healing. You may cover the area with a gauze bandage if it weeps or rubs against clothing. Change the bandage every day.     · Keep the area clean and dry. Other instructions    · If you breastfeed your baby, you may be more comfortable while you are healing if you place the baby so that he or she is not resting on your belly. Try tucking your baby under your arm, with his or her body along the side you will be feeding on. Support your baby's upper body with your arm. With that hand you can control your baby's head to bring his or her mouth to your breast. This is sometimes called the football hold. Follow-up care is a key part of your treatment and safety. Be sure to make and go to all appointments, and call your doctor if you are having problems. It's also a good idea to know your test results and keep a list of the medicines you take. When should you call for help? Call 911  anytime you think you may need emergency care. For example, call if:    · You have thoughts of harming yourself, your baby, or another person.     · You passed out (lost consciousness).     · You have chest pain, are short of breath, or cough up blood.     · You have a seizure.    Call your doctor now or seek immediate medical care if:    · You have pain that does not get better after you take pain medicine.     · You have severe vaginal bleeding.     · You are dizzy or lightheaded, or you feel like you may faint.     · You have new or worse pain in your belly or pelvis.     · You have loose stitches, or your incision comes open.     · You have symptoms of infection, such as:  ? Increased pain, swelling, warmth, or redness. ? Red streaks leading from the incision. ? Pus draining from the incision. ? A fever.     · You have symptoms of a blood clot in your leg (called a deep vein thrombosis), such as:  ? Pain in your calf, back of the knee, thigh, or groin. ? Redness and swelling in your leg or groin.     · You have signs of preeclampsia, such as:  ? Sudden swelling of your face, hands, or feet. ? New vision problems (such as dimness, blurring, or seeing spots). ? A severe headache. Watch closely for changes in your health, and be sure to contact your doctor if:    · You do not get better as expected. Where can you learn more? Go to http://www.[employer].com/  Enter M806 in the search box to learn more about \" Section: What to Expect at Home. \"  Current as of: 2021               Content Version: 13.0   [employer]. Care instructions adapted under license by [employer] (which disclaims liability or warranty for this information). If you have questions about a medical condition or this instruction, always ask your healthcare professional. [place] [address] any warranty or liability for your use of this information.

## 2022-03-18 PROBLEM — O32.9XX0 MULTIPLE GESTATION WITH ONE OR MORE FETAL MALPRESENTATIONS IN THIRD TRIMESTER: Status: ACTIVE | Noted: 2021-12-06

## 2022-03-18 PROBLEM — O30.93 MULTIPLE GESTATION WITH ONE OR MORE FETAL MALPRESENTATIONS IN THIRD TRIMESTER: Status: ACTIVE | Noted: 2021-12-06

## 2022-03-19 PROBLEM — O26.643 CHOLESTASIS DURING PREGNANCY IN THIRD TRIMESTER: Status: ACTIVE | Noted: 2021-11-14

## 2022-03-19 PROBLEM — O30.039 MONOCHORIONIC DIAMNIOTIC TWIN GESTATION: Status: ACTIVE | Noted: 2021-12-06

## 2025-04-10 ENCOUNTER — INITIAL PRENATAL (OUTPATIENT)
Age: 35
End: 2025-04-10

## 2025-04-10 VITALS
TEMPERATURE: 97.4 F | DIASTOLIC BLOOD PRESSURE: 60 MMHG | WEIGHT: 157.6 LBS | RESPIRATION RATE: 16 BRPM | HEIGHT: 63 IN | BODY MASS INDEX: 27.93 KG/M2 | HEART RATE: 75 BPM | SYSTOLIC BLOOD PRESSURE: 89 MMHG | OXYGEN SATURATION: 99 %

## 2025-04-10 DIAGNOSIS — Z34.90 PREGNANCY, UNSPECIFIED GESTATIONAL AGE: Primary | ICD-10-CM

## 2025-04-10 PROCEDURE — 0500F INITIAL PRENATAL CARE VISIT: CPT | Performed by: OBSTETRICS & GYNECOLOGY

## 2025-04-10 RX ORDER — OMEGA-3-ACID ETHYL ESTERS 1 G/1
1 CAPSULE, LIQUID FILLED ORAL 2 TIMES DAILY
COMMUNITY

## 2025-04-10 RX ORDER — MAGNESIUM 30 MG
30 TABLET ORAL 2 TIMES DAILY
COMMUNITY

## 2025-04-10 NOTE — PROGRESS NOTES
Radha Mast is a 34 y.o. female presents for a new pregnancy visit.    Chief Complaint   Patient presents with    Initial Prenatal Visit     Patient's last menstrual period was 2024 (exact date).    Some records scanned into media- pt needs urine culture sent today per records    Declined genetic testing    Last Pap: 25 NILM/HPV neg    Feeling better in the 2nd trimester!    Hand eczema (flares). H/o Cholestasis in pregnancy- concerned it would be hard for her to tell the directly    Saw Endocrinologist approx 2 years ago for Microadenoma follow up    H/o  - Mono Di Twins (2nd twin breech), Pituitary Microadenoma (asymptomatic during previous pregnancy per records, Migraines, Polypectomy    2 girls! 3 yo     LMP history:  The date of her LMP is 24.    Pregnancy symptoms:  Since her LMP she has experienced urinary frequency, breast tenderness, and nausea.   She has not been vomiting over the last few weeks.  Associated signs and symptoms which she denies: dysuria, discharge, vaginal bleeding.    Relevant past pregnancy history:   She has the following pregnancy history: - C/S  twins    She has no history of  delivery.    Relevant past medical history:(relevant to this pregnancy): noncontributory.       1. Have you been to the ER, urgent care clinic, or hospitalized since your last visit? No    2. Have you seen or consulted any other health care providers outside of the [facility] since your last visit? Aubree Powers LPN  4/10/2025  12:08 PM      
with G1    PMH:  -BMI 28 -  approp wt gain  -eczema hands  -microadenoma (prolactinoma) --> no meds, asymptomatic, last saw endocrine 2 years ago (consider referral and labs next visit)  -migraines (ocular? Aura?)  -h/o hysteroscopic polypectomy  -fam h/o mom with term stillbirth  -severe n/v after anesthesia with CS    Genetics/Carrier screening: declined pano/hz/afp    PNL: O pos / ABSC neg / needs hgb electrophoresis / Hgb 12.0, plts 332 / HIV, hepB, hepC, rubella, tpal neg / needs: VZV, chl, Trich / GC neg / urine cx 4/10 / pap NILM HPV neg 3/18/25  -Glucola:  -28 week labs:  -GBS:    Vaccines:  -Flu:  -Covid:  -Tdap:  -RSV:    Delivery/PP plans:  -Breast/Formula  -NCB/Epid  -Gender/Circ?    Social:  -FOB:  Dani  -twin 3 yo girls! Kayla  -pt's family from [place], some [employer]ism ancestry  -works from home as , pro-life    RTC: 1 month, or sooner prn for problems or concerns  -cramping, pain, bleeding precautions reviewed  -handouts and instructions provided     Jenn Duke MD  4/10/2025  4:08 PM

## 2025-04-12 LAB
BACTERIA SPEC CULT: NORMAL
CC UR VC: NORMAL
SERVICE CMNT-IMP: NORMAL

## 2025-04-14 ENCOUNTER — RESULTS FOLLOW-UP (OUTPATIENT)
Age: 35
End: 2025-04-14

## 2025-04-22 ENCOUNTER — TELEPHONE (OUTPATIENT)
Age: 35
End: 2025-04-22

## 2025-04-22 NOTE — TELEPHONE ENCOUNTER
I left message for patient to call. She went to meet and greet for the midwives and interested in setting up appt.

## 2025-04-24 ENCOUNTER — ROUTINE PRENATAL (OUTPATIENT)
Age: 35
End: 2025-04-24

## 2025-04-24 VITALS
DIASTOLIC BLOOD PRESSURE: 63 MMHG | TEMPERATURE: 97.9 F | HEIGHT: 63 IN | WEIGHT: 160 LBS | RESPIRATION RATE: 15 BRPM | SYSTOLIC BLOOD PRESSURE: 96 MMHG | HEART RATE: 79 BPM | BODY MASS INDEX: 28.35 KG/M2

## 2025-04-24 DIAGNOSIS — Z34.90 PREGNANCY, UNSPECIFIED GESTATIONAL AGE: Primary | ICD-10-CM

## 2025-04-24 DIAGNOSIS — D36.9 MICROADENOMA: ICD-10-CM

## 2025-04-24 PROCEDURE — 0502F SUBSEQUENT PRENATAL CARE: CPT | Performed by: OBSTETRICS & GYNECOLOGY
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
## 2025-04-30 ENCOUNTER — OFFICE VISIT (OUTPATIENT)
Age: 35
End: 2025-04-30
Payer: COMMERCIAL

## 2025-04-30 VITALS
DIASTOLIC BLOOD PRESSURE: 56 MMHG | HEART RATE: 84 BPM | HEIGHT: 63 IN | BODY MASS INDEX: 28.31 KG/M2 | OXYGEN SATURATION: 98 % | SYSTOLIC BLOOD PRESSURE: 93 MMHG | RESPIRATION RATE: 16 BRPM | WEIGHT: 159.8 LBS

## 2025-04-30 DIAGNOSIS — D35.2 PITUITARY MICROADENOMA (HCC): Primary | ICD-10-CM

## 2025-04-30 PROCEDURE — 99204 OFFICE O/P NEW MOD 45 MIN: CPT | Performed by: INTERNAL MEDICINE

## 2025-04-30 PROCEDURE — G2211 COMPLEX E/M VISIT ADD ON: HCPCS | Performed by: INTERNAL MEDICINE

## 2025-04-30 NOTE — PROGRESS NOTES
Chief Complaint   Patient presents with    Microadenoma      18 wks pregnant     Routine Prenatal Visit    New Patient     History of Present Illness: Radha Mast is a 34 y.o. female seen for discussion related to a pituitary microadenoma.    History of Present Illness  The patient is a 34-year-old female who presents for pituitary adenoma. She is accompanied by her twin daughters.    The chief complaint is a history of pituitary adenoma. Initially, irregular menstrual cycles prompted her to seek medical attention. In early 2020, elevated prolactin levels and a polyp were discovered by her OB/GYN. An MRI confirmed the presence of a pituitary adenoma (0.3mm). Cabergoline was prescribed, but treatment was discontinued upon becoming pregnant with twins in 03/2021. During her pregnancy, virtual monitoring was conducted, and she was advised to watch for symptoms such as changes in peripheral vision, which did not occur. Follow-up resumed when her children were 2 years old, involving blood tests that showed no abnormalities.    She successfully  her twins for 11 months without any supply issues, although supplementation with formula was necessary. Menstrual cycles have been regular since then, with occasional exceptions. No cabergoline or bromocriptine has been taken since early 2021. There is no history of gestational diabetes. Headaches occur if she sleeps less than 10 hours, but these are not severe. Visual disturbances are reported approximately once a year, resolving with sleep. She has not consulted an ophthalmologist for these symptoms. Birth control was used in early 2020 but discontinued due to adverse feelings.    No family history of pituitary adenomas, thyroid cancer, or pancreatic cancer is reported. Her siblings are healthy.    FAMILY HISTORY  She reports no family history of pituitary adenomas, thyroid cancer, or pancreatic cancer.      Past Medical History:   Diagnosis Date    Cholestasis

## 2025-05-12 SDOH — ECONOMIC STABILITY: TRANSPORTATION INSECURITY
IN THE PAST 12 MONTHS, HAS THE LACK OF TRANSPORTATION KEPT YOU FROM MEDICAL APPOINTMENTS OR FROM GETTING MEDICATIONS?: NO

## 2025-05-12 SDOH — ECONOMIC STABILITY: FOOD INSECURITY: WITHIN THE PAST 12 MONTHS, THE FOOD YOU BOUGHT JUST DIDN'T LAST AND YOU DIDN'T HAVE MONEY TO GET MORE.: NEVER TRUE

## 2025-05-12 SDOH — ECONOMIC STABILITY: FOOD INSECURITY: WITHIN THE PAST 12 MONTHS, YOU WORRIED THAT YOUR FOOD WOULD RUN OUT BEFORE YOU GOT MONEY TO BUY MORE.: NEVER TRUE

## 2025-05-12 SDOH — ECONOMIC STABILITY: INCOME INSECURITY: IN THE LAST 12 MONTHS, WAS THERE A TIME WHEN YOU WERE NOT ABLE TO PAY THE MORTGAGE OR RENT ON TIME?: NO

## 2025-05-12 SDOH — ECONOMIC STABILITY: TRANSPORTATION INSECURITY
IN THE PAST 12 MONTHS, HAS LACK OF TRANSPORTATION KEPT YOU FROM MEETINGS, WORK, OR FROM GETTING THINGS NEEDED FOR DAILY LIVING?: NO

## 2025-05-14 DIAGNOSIS — Z34.90 PREGNANCY, UNSPECIFIED GESTATIONAL AGE: Primary | ICD-10-CM

## 2025-05-15 ENCOUNTER — ROUTINE PRENATAL (OUTPATIENT)
Age: 35
End: 2025-05-15

## 2025-05-15 VITALS
SYSTOLIC BLOOD PRESSURE: 102 MMHG | BODY MASS INDEX: 30.36 KG/M2 | TEMPERATURE: 98.3 F | RESPIRATION RATE: 16 BRPM | HEART RATE: 73 BPM | DIASTOLIC BLOOD PRESSURE: 65 MMHG | OXYGEN SATURATION: 98 % | WEIGHT: 165 LBS | HEIGHT: 62 IN

## 2025-05-15 VITALS — HEART RATE: 86 BPM | DIASTOLIC BLOOD PRESSURE: 69 MMHG | SYSTOLIC BLOOD PRESSURE: 105 MMHG

## 2025-05-15 DIAGNOSIS — Z34.90 PREGNANCY, UNSPECIFIED GESTATIONAL AGE: ICD-10-CM

## 2025-05-15 DIAGNOSIS — Z34.90 PREGNANCY, UNSPECIFIED GESTATIONAL AGE: Primary | ICD-10-CM

## 2025-05-15 PROCEDURE — 0502F SUBSEQUENT PRENATAL CARE: CPT | Performed by: OBSTETRICS & GYNECOLOGY

## 2025-05-15 SDOH — ECONOMIC STABILITY: FOOD INSECURITY: WITHIN THE PAST 12 MONTHS, THE FOOD YOU BOUGHT JUST DIDN'T LAST AND YOU DIDN'T HAVE MONEY TO GET MORE.: NEVER TRUE

## 2025-05-15 SDOH — ECONOMIC STABILITY: FOOD INSECURITY: WITHIN THE PAST 12 MONTHS, YOU WORRIED THAT YOUR FOOD WOULD RUN OUT BEFORE YOU GOT MONEY TO BUY MORE.: NEVER TRUE

## 2025-05-15 ASSESSMENT — PATIENT HEALTH QUESTIONNAIRE - PHQ9
SUM OF ALL RESPONSES TO PHQ QUESTIONS 1-9: 0
1. LITTLE INTEREST OR PLEASURE IN DOING THINGS: NOT AT ALL
SUM OF ALL RESPONSES TO PHQ QUESTIONS 1-9: 0
2. FEELING DOWN, DEPRESSED OR HOPELESS: NOT AT ALL

## 2025-05-15 NOTE — PROGRESS NOTES
Pt doing well.   FAS later today with MFM.  No LOF or VB.  Feeling FM.   Glucola next visit.    RTC 4 wk

## 2025-05-15 NOTE — PROGRESS NOTES
Patient was seen 5/15/2025      Please look under media to view full consult and ultrasound report in [employer].         Lachelle Shukla MD   Maternal Fetal Medicine

## 2025-06-10 ENCOUNTER — ROUTINE PRENATAL (OUTPATIENT)
Age: 35
End: 2025-06-10

## 2025-06-10 VITALS
TEMPERATURE: 97.8 F | WEIGHT: 170.6 LBS | HEART RATE: 78 BPM | SYSTOLIC BLOOD PRESSURE: 101 MMHG | BODY MASS INDEX: 31.2 KG/M2 | DIASTOLIC BLOOD PRESSURE: 68 MMHG

## 2025-06-10 DIAGNOSIS — Z34.90 PREGNANCY, UNSPECIFIED GESTATIONAL AGE: Primary | ICD-10-CM

## 2025-06-10 PROCEDURE — 0502F SUBSEQUENT PRENATAL CARE: CPT | Performed by: OBSTETRICS & GYNECOLOGY

## 2025-07-01 ENCOUNTER — ROUTINE PRENATAL (OUTPATIENT)
Age: 35
End: 2025-07-01

## 2025-07-01 ENCOUNTER — LAB (OUTPATIENT)
Age: 35
End: 2025-07-01

## 2025-07-01 VITALS
HEIGHT: 62 IN | WEIGHT: 175 LBS | BODY MASS INDEX: 32.2 KG/M2 | OXYGEN SATURATION: 99 % | DIASTOLIC BLOOD PRESSURE: 64 MMHG | SYSTOLIC BLOOD PRESSURE: 100 MMHG | TEMPERATURE: 97.5 F | HEART RATE: 80 BPM | RESPIRATION RATE: 14 BRPM

## 2025-07-01 DIAGNOSIS — Z34.90 PREGNANCY, UNSPECIFIED GESTATIONAL AGE: Primary | ICD-10-CM

## 2025-07-01 PROCEDURE — 0502F SUBSEQUENT PRENATAL CARE: CPT | Performed by: OBSTETRICS & GYNECOLOGY

## 2025-07-01 NOTE — PROGRESS NOTES
Doing well.  Good FM.  No LOF, VB, ctx.  Glucola, 28 wk labs today  Would like to hold off on Tdap today    RTC 2 wk    Jenn Duke MD  7/1/2025  9:36 AM

## 2025-07-01 NOTE — PROGRESS NOTES
Chief Complaint   Patient presents with    Routine Prenatal Visit       Doing well since last visit    Active FM  Denies lof, vb & contractions    1. Have you been to the ER, urgent care clinic since your last visit?  Hospitalized since your last visit?No    2. Have you seen or consulted any other health care providers outside of the [facility] since your last visit?  Include any pap smears or colon screening. No

## 2025-07-02 ENCOUNTER — RESULTS FOLLOW-UP (OUTPATIENT)
Age: 35
End: 2025-07-02

## 2025-07-02 DIAGNOSIS — Z34.90 PREGNANCY, UNSPECIFIED GESTATIONAL AGE: Primary | ICD-10-CM

## 2025-07-02 LAB
BLD GP AB SCN SERPL QL: NEGATIVE
ERYTHROCYTE [DISTWIDTH] IN BLOOD BY AUTOMATED COUNT: 12.3 % (ref 11.7–15.4)
HCT VFR BLD AUTO: 35.1 % (ref 34–46.6)
HGB BLD-MCNC: 11.4 G/DL (ref 11.1–15.9)
HIV 1+2 AB+HIV1 P24 AG SERPL QL IA: NON REACTIVE
MCH RBC QN AUTO: 28.4 PG (ref 26.6–33)
MCHC RBC AUTO-ENTMCNC: 32.5 G/DL (ref 31.5–35.7)
MCV RBC AUTO: 87 FL (ref 79–97)
PLATELET # BLD AUTO: 256 X10E3/UL (ref 150–450)
RBC # BLD AUTO: 4.02 X10E6/UL (ref 3.77–5.28)
VZV IGG SER QL IA: REACTIVE
WBC # BLD AUTO: 11 X10E3/UL (ref 3.4–10.8)

## 2025-07-03 LAB
GLUCOSE 1H P 50 G GLC PO SERPL-MCNC: 94 MG/DL (ref 70–139)
HGB A MFR BLD ELPH: 97.1 % (ref 96.4–98.8)
HGB A2 MFR BLD ELPH: 2.9 % (ref 1.8–3.2)
HGB F MFR BLD ELPH: 0 % (ref 0–2)
HGB FRACT BLD-IMP: NORMAL
HGB S MFR BLD ELPH: 0 %
INTERPRETATION: NORMAL
TREPONEMA PALLIDUM IGG+IGM AB [PRESENCE] IN SERUM OR PLASMA BY IMMUNOASSAY: NON REACTIVE

## 2025-07-04 LAB
C TRACH RRNA SPEC QL NAA+PROBE: NEGATIVE
N GONORRHOEA RRNA SPEC QL NAA+PROBE: NEGATIVE
T VAGINALIS RRNA SPEC QL NAA+PROBE: NEGATIVE

## 2025-07-15 ENCOUNTER — ROUTINE PRENATAL (OUTPATIENT)
Age: 35
End: 2025-07-15

## 2025-07-15 VITALS — SYSTOLIC BLOOD PRESSURE: 98 MMHG | DIASTOLIC BLOOD PRESSURE: 66 MMHG | HEART RATE: 85 BPM

## 2025-07-15 DIAGNOSIS — Z34.90 PREGNANCY, UNSPECIFIED GESTATIONAL AGE: ICD-10-CM

## 2025-07-15 NOTE — PROGRESS NOTES
Patient was seen 7/15/2025      Please look under media to view full consult and ultrasound report in [facility].         Lachelle Shukla MD   Maternal Fetal Medicine

## 2025-07-15 NOTE — PROCEDURES
PATIENT: VINAY TOBIAS   -  : 1990   -  DOS:07/15/2025   -  INTERPRETING PROVIDER:Lachelle Shukla,   Indication  ========    Pituitary Microadenoma    Method  ======    Transabdominal ultrasound examination. View: Sufficient    Pregnancy  =========    Castellanos pregnancy. Number of fetuses: 1    Dating  ======    LMP on: 2024  [place] by LMP 29 w + 3 d  [place] by LMP: 2025  Previous Ultrasound on: 2025  Type of prior assessment: CRL  U/S measurement at prior assessment date 29.2 mm  GA by previous U/S 29 w + 2 d  [place] by previous Ultrasound: 2025  Ultrasound examination on: 7/15/2025  GA by U/S based upon: AC, BPD, Femur, HC  GA by U/S 29 w + 6 d  [place] by U/S: 2025  Assigned: based on the LMP, selected on 05/15/2025  Assigned GA 29 w + 3 d  Assigned [place]: 2025    Fetal Biometry  ============    Standard  BPD 71.2 mm 28w 4d 15% Hadlock  .3 mm 33w 1d >99% Lynette  .0 mm 30w 3d 45% Hadlock  .7 mm 30w 1d 65% Hadlock  Femur 57.2 mm 30w 0d 51% Hadlock  EFW 1,490 g 29w 4d 57% Hadlock  EFW (lb) 3 lb  EFW (oz) 5 oz  EFW by: Hadlock (BPD-HC-AC-FL)  Other Structures   bpm    General Evaluation  ==============    Cardiac activity present.  bpm. Fetal movements: visualized. Presentation: Transverse, head to maternal right  Placenta: Placental site: posterior, appropriate distance from the internal os  Umbilical cord: Cord vessels: 3 vessel cord. Insertion site: central  Amniotic fluid: Amount of AF: normal. MVP 3.5 cm. MARIA ALEJANDRA 11.1 cm. Q1 3.5 cm, Q2 2.7 cm, Q3 3.0 cm, Q4 1.9 cm    Fetal Anatomy  ===========    Stomach: normal  Kidneys: normal  Bladder: normal  Wants to know fetal sex: yes    Findings  =======    Intrauterine Castellanos pregnancy at 29w 3d by clinical dates.  EFW is 1490 g at 57%, abdominal circumference at 65%.  Amniotic fluid: normal.  Placenta is posterior, appropriate distance from the internal os.  Transverse, head to maternal right

## 2025-07-22 ENCOUNTER — ROUTINE PRENATAL (OUTPATIENT)
Age: 35
End: 2025-07-22

## 2025-07-22 VITALS
TEMPERATURE: 97.9 F | BODY MASS INDEX: 31.71 KG/M2 | SYSTOLIC BLOOD PRESSURE: 101 MMHG | HEIGHT: 63 IN | WEIGHT: 179 LBS | OXYGEN SATURATION: 97 % | RESPIRATION RATE: 16 BRPM | DIASTOLIC BLOOD PRESSURE: 66 MMHG | HEART RATE: 79 BPM

## 2025-07-22 DIAGNOSIS — Z34.90 PREGNANCY, UNSPECIFIED GESTATIONAL AGE: Primary | ICD-10-CM

## 2025-07-22 PROCEDURE — 0502F SUBSEQUENT PRENATAL CARE: CPT | Performed by: OBSTETRICS & GYNECOLOGY

## 2025-07-22 NOTE — PROGRESS NOTES
Doing well since last visit  Active FM  Denies lof, vb & contractions  Plans Tdap next visit, declines today  No other concerns.  MFM next 8/13.    RTC 2 wks

## 2025-07-22 NOTE — PROGRESS NOTES
Chief Complaint   Patient presents with    Routine Prenatal Visit       Doing well since last visit    Active FM  Denies lof, vb & contractions    1. Have you been to the ER, urgent care clinic since your last visit?  Hospitalized since your last visit?No    2. Have you seen or consulted any other health care providers outside of the [facility] since your last visit?  Include any pap smears or colon screening. No

## 2025-08-13 ENCOUNTER — ROUTINE PRENATAL (OUTPATIENT)
Age: 35
End: 2025-08-13

## 2025-08-13 ENCOUNTER — ROUTINE PRENATAL (OUTPATIENT)
Age: 35
End: 2025-08-13
Payer: COMMERCIAL

## 2025-08-13 VITALS
SYSTOLIC BLOOD PRESSURE: 92 MMHG | HEART RATE: 82 BPM | TEMPERATURE: 98.1 F | DIASTOLIC BLOOD PRESSURE: 61 MMHG | OXYGEN SATURATION: 96 % | RESPIRATION RATE: 12 BRPM | WEIGHT: 186 LBS | BODY MASS INDEX: 34.23 KG/M2 | HEIGHT: 62 IN

## 2025-08-13 VITALS — DIASTOLIC BLOOD PRESSURE: 57 MMHG | HEART RATE: 105 BPM | SYSTOLIC BLOOD PRESSURE: 90 MMHG

## 2025-08-13 DIAGNOSIS — D35.2 PITUITARY ADENOMA (HCC): Primary | ICD-10-CM

## 2025-08-13 DIAGNOSIS — Z3A.33 33 WEEKS GESTATION OF PREGNANCY: ICD-10-CM

## 2025-08-13 DIAGNOSIS — Z34.90 PREGNANCY, UNSPECIFIED GESTATIONAL AGE: Primary | ICD-10-CM

## 2025-08-13 PROBLEM — O30.93 MULTIPLE GESTATION WITH ONE OR MORE FETAL MALPRESENTATIONS IN THIRD TRIMESTER: Status: RESOLVED | Noted: 2021-12-06 | Resolved: 2025-08-13

## 2025-08-13 PROBLEM — O26.643 CHOLESTASIS DURING PREGNANCY IN THIRD TRIMESTER: Status: RESOLVED | Noted: 2021-11-14 | Resolved: 2025-08-13

## 2025-08-13 PROBLEM — O30.039 MONOCHORIONIC DIAMNIOTIC TWIN GESTATION: Status: RESOLVED | Noted: 2021-12-06 | Resolved: 2025-08-13

## 2025-08-13 PROBLEM — O32.9XX0 MULTIPLE GESTATION WITH ONE OR MORE FETAL MALPRESENTATIONS IN THIRD TRIMESTER: Status: RESOLVED | Noted: 2021-12-06 | Resolved: 2025-08-13

## 2025-08-13 PROCEDURE — 0502F SUBSEQUENT PRENATAL CARE: CPT | Performed by: OBSTETRICS & GYNECOLOGY

## 2025-08-13 PROCEDURE — 76816 OB US FOLLOW-UP PER FETUS: CPT | Performed by: OBSTETRICS & GYNECOLOGY

## 2025-08-13 PROCEDURE — 99024 POSTOP FOLLOW-UP VISIT: CPT | Performed by: OBSTETRICS & GYNECOLOGY

## 2025-08-25 ENCOUNTER — ROUTINE PRENATAL (OUTPATIENT)
Age: 35
End: 2025-08-25
Payer: COMMERCIAL

## 2025-08-25 VITALS
SYSTOLIC BLOOD PRESSURE: 105 MMHG | DIASTOLIC BLOOD PRESSURE: 72 MMHG | HEIGHT: 62 IN | BODY MASS INDEX: 35.33 KG/M2 | HEART RATE: 89 BPM | WEIGHT: 192 LBS | RESPIRATION RATE: 16 BRPM | TEMPERATURE: 98.6 F | OXYGEN SATURATION: 96 %

## 2025-08-25 DIAGNOSIS — Z34.90 PREGNANCY, UNSPECIFIED GESTATIONAL AGE: Primary | ICD-10-CM

## 2025-08-25 PROCEDURE — 90715 TDAP VACCINE 7 YRS/> IM: CPT | Performed by: OBSTETRICS & GYNECOLOGY

## 2025-08-25 PROCEDURE — 90471 IMMUNIZATION ADMIN: CPT | Performed by: OBSTETRICS & GYNECOLOGY

## 2025-08-25 PROCEDURE — 0502F SUBSEQUENT PRENATAL CARE: CPT | Performed by: OBSTETRICS & GYNECOLOGY

## 2025-09-02 ENCOUNTER — ROUTINE PRENATAL (OUTPATIENT)
Age: 35
End: 2025-09-02

## 2025-09-02 VITALS
SYSTOLIC BLOOD PRESSURE: 104 MMHG | OXYGEN SATURATION: 98 % | HEART RATE: 78 BPM | BODY MASS INDEX: 34.75 KG/M2 | WEIGHT: 190 LBS | TEMPERATURE: 97.7 F | DIASTOLIC BLOOD PRESSURE: 68 MMHG

## 2025-09-02 DIAGNOSIS — Z34.90 PREGNANCY, UNSPECIFIED GESTATIONAL AGE: Primary | ICD-10-CM

## 2025-09-02 PROCEDURE — 0502F SUBSEQUENT PRENATAL CARE: CPT | Performed by: OBSTETRICS & GYNECOLOGY

## 2025-09-04 LAB — GP B STREP DNA SPEC QL NAA+PROBE: NEGATIVE

## (undated) DEVICE — ADHESIVE TISS DERMA FLEX 0.7ML -- HIGH VISCOSITY

## (undated) DEVICE — TIP CLEANER: Brand: VALLEYLAB

## (undated) DEVICE — C-SECTION II-LF: Brand: MEDLINE INDUSTRIES, INC.

## (undated) DEVICE — SUTURE VCRL SZ 2-0 L36IN ABSRB VLT L36MM CT-1 1/2 CIR J345H

## (undated) DEVICE — SOLIDIFIER FLD 2OZ 1500CC N DISINF IN BTL DISP SAFESORB

## (undated) DEVICE — SOLUTION IV 1000ML 0.9% SOD CHL

## (undated) DEVICE — MEDI-VAC NON-CONDUCTIVE SUCTION TUBING: Brand: CARDINAL HEALTH

## (undated) DEVICE — (D)PREP SKN CHLRAPRP APPL 26ML -- CONVERT TO ITEM 371833

## (undated) DEVICE — STERILE POLYISOPRENE POWDER-FREE SURGICAL GLOVES: Brand: PROTEXIS

## (undated) DEVICE — LARGE, DISPOSABLE ALEXIS O C-SECTION PROTECTOR - RETRACTOR: Brand: ALEXIS ® O C-SECTION PROTECTOR - RETRACTOR

## (undated) DEVICE — 3000CC GUARDIAN II: Brand: GUARDIAN

## (undated) DEVICE — STRAP,POSITIONING,KNEE/BODY,FOAM,4X60": Brand: MEDLINE

## (undated) DEVICE — SUTURE VCRL SZ 0 L36IN ABSRB VLT L40MM CT 1/2 CIR J358H

## (undated) DEVICE — STAPLER SKIN SQ 30 ABSRB STPL DISP INSORB

## (undated) DEVICE — SUTURE STRATAFIX SYMMETRIC SZ 1 L18IN ABSRB VLT CT1 L36CM SXPP1A404

## (undated) DEVICE — PENCIL ES L3M BTTN SWCH S STL HEX LOK BLDE ELECTRD HOLSTER

## (undated) DEVICE — STERILE POLYISOPRENE POWDER-FREE SURGICAL GLOVES WITH EMOLLIENT COATING: Brand: PROTEXIS

## (undated) DEVICE — REM POLYHESIVE ADULT PATIENT RETURN ELECTRODE: Brand: VALLEYLAB